# Patient Record
Sex: FEMALE | Race: ASIAN | NOT HISPANIC OR LATINO | Employment: UNEMPLOYED | URBAN - METROPOLITAN AREA
[De-identification: names, ages, dates, MRNs, and addresses within clinical notes are randomized per-mention and may not be internally consistent; named-entity substitution may affect disease eponyms.]

---

## 2017-04-17 ENCOUNTER — HOSPITAL ENCOUNTER (OUTPATIENT)
Dept: RADIOLOGY | Facility: MEDICAL CENTER | Age: 42
End: 2017-04-17
Attending: INTERNAL MEDICINE
Payer: COMMERCIAL

## 2017-04-17 DIAGNOSIS — Z11.1 SCREENING-PULMONARY TB: ICD-10-CM

## 2017-04-17 PROCEDURE — 71010 DX-CHEST-LIMITED (1 VIEW): CPT

## 2017-07-07 ENCOUNTER — HOSPITAL ENCOUNTER (OUTPATIENT)
Dept: RADIOLOGY | Facility: MEDICAL CENTER | Age: 42
End: 2017-07-07
Attending: FAMILY MEDICINE
Payer: COMMERCIAL

## 2017-07-07 ENCOUNTER — OFFICE VISIT (OUTPATIENT)
Dept: URGENT CARE | Facility: PHYSICIAN GROUP | Age: 42
End: 2017-07-07
Payer: COMMERCIAL

## 2017-07-07 VITALS
WEIGHT: 140 LBS | OXYGEN SATURATION: 98 % | SYSTOLIC BLOOD PRESSURE: 118 MMHG | TEMPERATURE: 98.3 F | BODY MASS INDEX: 24.02 KG/M2 | DIASTOLIC BLOOD PRESSURE: 70 MMHG | HEART RATE: 77 BPM

## 2017-07-07 DIAGNOSIS — K59.00 CONSTIPATION, UNSPECIFIED CONSTIPATION TYPE: ICD-10-CM

## 2017-07-07 DIAGNOSIS — R10.9 ABDOMINAL PAIN, UNSPECIFIED LOCATION: ICD-10-CM

## 2017-07-07 LAB
APPEARANCE UR: CLEAR
BILIRUB UR STRIP-MCNC: NORMAL MG/DL
COLOR UR AUTO: NORMAL
GLUCOSE UR STRIP.AUTO-MCNC: NORMAL MG/DL
INT CON NEG: NEGATIVE
INT CON POS: POSITIVE
KETONES UR STRIP.AUTO-MCNC: NORMAL MG/DL
LEUKOCYTE ESTERASE UR QL STRIP.AUTO: NORMAL
NITRITE UR QL STRIP.AUTO: NORMAL
PH UR STRIP.AUTO: 6 [PH] (ref 5–8)
POC URINE PREGNANCY TEST: NEGATIVE
PROT UR QL STRIP: NORMAL MG/DL
RBC UR QL AUTO: NORMAL
SP GR UR STRIP.AUTO: 1.01
UROBILINOGEN UR STRIP-MCNC: NORMAL MG/DL

## 2017-07-07 PROCEDURE — 99214 OFFICE O/P EST MOD 30 MIN: CPT | Performed by: FAMILY MEDICINE

## 2017-07-07 PROCEDURE — 74020 DX-ABDOMEN-2 VIEWS: CPT

## 2017-07-07 PROCEDURE — 81025 URINE PREGNANCY TEST: CPT | Performed by: FAMILY MEDICINE

## 2017-07-07 PROCEDURE — 81002 URINALYSIS NONAUTO W/O SCOPE: CPT | Performed by: FAMILY MEDICINE

## 2017-07-07 NOTE — PROGRESS NOTES
Chief Complaint:    Chief Complaint   Patient presents with   • Pain     has needle sharp pain on stomach x2days       History of Present Illness:    This is a new problem. 2 days ago, felt some pain in left mid abdomen region. It felt tender to touch skin, but it did not feel burning, tingling, or numbness in area. Recently now, she is starting to feel pain across abdomen, into right mid abdomen region too. Pain fluctuates, but is unpredictable. No fever, chills, nausea, vomiting, diarrhea, constipation, urine symptoms, or rash in area. Has all abdominal organs. No injury or trauma. No meds taken for the pain.      Review of Systems:    Constitutional: Negative for fever, chills, and diaphoresis.   Eyes: Negative for change in vision, photophobia, pain, redness, and discharge.  ENT: Negative for ear pain, ear discharge, hearing loss, tinnitus, nasal congestion, nosebleeds, and sore throat.    Respiratory: Negative for cough, hemoptysis, sputum production, shortness of breath, wheezing, and stridor.    Cardiovascular: Negative for chest pain, palpitations, orthopnea, claudication, leg swelling, and PND.   Gastrointestinal: See HPI.   Genitourinary: Negative for dysuria, urinary urgency, urinary frequency, hematuria, and flank pain.   Musculoskeletal: Negative for myalgias, joint pain, neck pain, and back pain.   Skin: Negative for rash and itching.   Neurological: Negative for dizziness, tingling, tremors, sensory change, speech change, focal weakness, seizures, loss of consciousness, and headaches.   Endo: Negative for polydipsia.   Heme: Does not bruise/bleed easily.   Psychiatric/Behavioral: Negative for depression, suicidal ideas, hallucinations, memory loss and substance abuse. The patient is not nervous/anxious and does not have insomnia.      Past Medical History:    History reviewed. No pertinent past medical history.    Past Surgical History:    History reviewed. No pertinent past surgical  history.    Social History:    Social History     Social History   • Marital Status:      Spouse Name: N/A   • Number of Children: N/A   • Years of Education: N/A     Occupational History   • Not on file.     Social History Main Topics   • Smoking status: Never Smoker    • Smokeless tobacco: Never Used   • Alcohol Use: No   • Drug Use: No   • Sexual Activity: No     Other Topics Concern   • Not on file     Social History Narrative       Family History:    History reviewed. No pertinent family history.    Medications:    Current Outpatient Prescriptions on File Prior to Visit   Medication Sig Dispense Refill   • triamcinolone acetonide (KENALOG) 0.1 % Cream APPLY THIN FILM TO RASH UP TO TWICE A DAY ONLY IF NEEDED. 80 g 3     No current facility-administered medications on file prior to visit.       Allergies:    No Known Allergies      Vitals:    Filed Vitals:    07/07/17 1547   BP: 118/70   Pulse: 77   Temp: 36.8 °C (98.3 °F)   Weight: 63.504 kg (140 lb)   SpO2: 98%       Physical Exam:    Constitutional: Vital signs reviewed. Appears well-developed and well-nourished. No acute distress.   Eyes: Sclera white, conjunctivae clear.   ENT: External ears normal. Hearing normal.   Neck: Neck supple.   Pulmonary/Chest: Respirations non-labored.   Abdomen: Bowel sounds are normal active. Soft, non-distended, and non-tender to palpation. No hepatosplenomegaly.   Musculoskeletal: Normal gait. Normal range of motion. No muscular atrophy or weakness.  Neurological: Alert and oriented to person, place, and time. Muscle tone normal. Coordination normal. Light touch and sensation normal.   Skin: No rashes or lesions. Warm, dry, normal turgor.  Psychiatric: Normal mood and affect. Behavior is normal. Judgment and thought content normal.     Diagnostics:    POCT URINALYSIS (Order #[id number]) on 7/7/17       Component Results      Component Value Ref Range & Units Status     POC Color straw Negative Final     POC Appearance  clear Negative Final     POC Leukocyte Esterase neg Negative Final     POC Nitrites neg Negative Final     POC Urobiligen neg Negative (0.2) mg/dL Final     POC Protein neg Negative mg/dL Final     POC Urine PH 6.0 5.0 - 8.0 Final     POC Blood neg Negative Final     POC Specific Gravity 1.010 <1.005 - >1.030 Final     POC Ketones neg Negative mg/dL Final     POC Biliruben neg Negative mg/dL Final     POC Glucose neg Negative mg/dL Final         Last Resulted Time     Fri Jul 7, 2017  4:26 PM     POCT PREGNANCY (Order #[id number]) on 7/7/17       Component Results      Component Value Ref Range & Units Status     POC Urine Pregnancy Test negative Negative Final     Internal Control Positive Positive  Final     Internal Control Negative Negative  Final         Last Resulted Time     Fri Jul 7, 2017  4:26 PM       HT-[id number]-0 VIEWS (Order #[id number]) on 7/7/17       Narrative        7/7/2017 4:29 PM    HISTORY/REASON FOR EXAM:  Left-sided abdominal pain for 2 days.      TECHNIQUE/EXAM DESCRIPTION AND NUMBER OF VIEWS:  2 view(s) of the abdomen, supine and upright.    COMPARISON: None    FINDINGS:  There is a nonobstructive nonspecific bowel gas pattern.  There is no evidence of free intraperitoneal air. There is a normal amount of stool throughout the colon.    There are no abnormal urinary tract calcifications.    There are phleboliths in the pelvis.         Impression        1.  Unremarkable abdominal series.     Images and Rad report reviewed with her and copy of report to her.      Assessment / Plan:    1. Abdominal pain, unspecified location  - POCT Urinalysis  - POCT PREGNANCY  - RN-TUFCUGY-2 VIEWS; Future  - polyethylene glycol-electrolytes (GOLYTELY) 236 GM Recon Soln; DRINK 240 ML (8 OUNCES) EVERY 10-30 MINUTES UNTIL HAVE A GOOD BOWEL MOVEMENT AND IMPROVEMENT OF ABDOMINAL PAIN.  Dispense: 4 L; Refill: 0    2. Constipation, unspecified constipation type  - polyethylene glycol-electrolytes (GOLYTELY) 236 GM  Recon Soln; DRINK 240 ML (8 OUNCES) EVERY 10-30 MINUTES UNTIL HAVE A GOOD BOWEL MOVEMENT AND IMPROVEMENT OF ABDOMINAL PAIN.  Dispense: 4 L; Refill: 0      Discussed with her DDX and management options.    Despite Radiology report, she looks to have enough gas and stool throughout abdomen to possibly cause her discomfort. I showed her example of abdominal x-ray that has much less gas and stool.    She does not have an acute abdomen on exam and she currently looks comfortable. I do not think she needs a CT of Abdomen now.    Agreeable to medication prescribed.    Advised if pain is persistently intense, she will need CT scan for evaluation and to return here or go to Emergency Room if after hours if this is the case.

## 2017-07-07 NOTE — MR AVS SNAPSHOT
Constantin Dev   2017 3:30 PM   Office Visit   MRN: [id number]    Department:  [facility]   Dept Phone:  [phone]    Description:  Female : 1975   Provider:  Efraín Hall M.D.           Reason for Visit     Pain has needle sharp pain on stomach x2days      Allergies as of 2017     No Known Allergies      You were diagnosed with     Abdominal pain, unspecified location   [[id number]]       Constipation, unspecified constipation type   [[id number]]         Vital Signs     Blood Pressure Pulse Temperature Weight Oxygen Saturation Smoking Status    118/70 mmHg 77 36.8 °C (98.3 °F) 63.504 kg (140 lb) 98% Never Smoker       Basic Information     Date Of Birth Sex Race Ethnicity Preferred Language    1975 Female  Non- English      Problem List              ICD-10-CM Priority Class Noted - Resolved    Intrinsic eczema L20.84   2016 - Present      Health Maintenance        Date Due Completion Dates    IMM DTaP/Tdap/Td Vaccine (1 - Tdap) 1993 ---    PAP SMEAR 1995 ---    MAMMOGRAM 2014 ---    IMM INFLUENZA (1) 2017 ---            Results     POCT Urinalysis      Component Value Standard Range & Units    POC Color straw Negative    POC Appearance clear Negative    POC Leukocyte Esterase neg Negative    POC Nitrites neg Negative    POC Urobiligen neg Negative (0.2) mg/dL    POC Protein neg Negative mg/dL    POC Urine PH 6.0 5.0 - 8.0    POC Blood neg Negative    POC Specific Gravity 1.010 <1.005 - >1.030    POC Ketones neg Negative mg/dL    POC Biliruben neg Negative mg/dL    POC Glucose neg Negative mg/dL                POCT PREGNANCY      Component Value Standard Range & Units    POC Urine Pregnancy Test negative Negative    Internal Control Positive Positive     Internal Control Negative Negative                         Current Immunizations     No immunizations on file.      Below and/or attached are the medications your provider expects you to take. Review all  of your home medications and newly ordered medications with your provider and/or pharmacist. Follow medication instructions as directed by your provider and/or pharmacist. Please keep your medication list with you and share with your provider. Update the information when medications are discontinued, doses are changed, or new medications (including over-the-counter products) are added; and carry medication information at all times in the event of emergency situations     Allergies:  No Known Allergies          Medications  Valid as of: July 07, 2017 -  5:24 PM    Generic Name Brand Name Tablet Size Instructions for use    PEG 3350-KCl-NaBcb-NaCl-NaSulf (Recon Soln) GOLYTELY 236 GM DRINK 240 ML (8 OUNCES) EVERY 10-30 MINUTES UNTIL HAVE A GOOD BOWEL MOVEMENT AND IMPROVEMENT OF ABDOMINAL PAIN.        Triamcinolone Acetonide (Cream) KENALOG 0.1 % APPLY THIN FILM TO RASH UP TO TWICE A DAY ONLY IF NEEDED.        .                 Medicines prescribed today were sent to:     [facility] [address] [place], [place] [address] [address] [address]    Phone: [phone] Fax: [phone]    Open 24 Hours?: No      Medication refill instructions:       If your prescription bottle indicates you have medication refills left, it is not necessary to call your provider’s office. Please contact your pharmacy and they will refill your medication.    If your prescription bottle indicates you do not have any refills left, you may request refills at any time through one of the following ways: The online myThings system (except Urgent Care), by calling your provider’s office, or by asking your pharmacy to contact your provider’s office with a refill request. Medication refills are processed only during regular business hours and may not be available until the next business day. Your provider may request additional information or to have a follow-up visit with you prior to refilling your medication.      *Please Note: Medication refills are assigned a new Rx number when refilled electronically. Your pharmacy may indicate that no refills were authorized even though a new prescription for the same medication is available at the pharmacy. Please request the medicine by name with the pharmacy before contacting your provider for a refill.        Your To Do List     Future Labs/Procedures Complete By Expires    [id number] VIEWS  As directed 7/28/2017         [employer] Access Code: [email or web address]  Expires: 8/6/2017  5:24 PM    Your email address is not on file at [facility].  Email Addresses are required for you to sign up for [employer], please contact [phone] to verify your personal information and to provide your email address prior to attempting to register for [employer].    Constantin Méndez  [address] [place] [id number]    Qu[employer]  A secure, online tool to manage your health information     [facility]’s Qu[employer]® is a secure, online tool that connects you to your personalized health information from the privacy of your home -- day or night - making it very easy for you to manage your healthcare. Once the activation process is completed, you can even access your medical information using the Quinyx AB lety, which is available for free in the Apple Lety store or Google Play store.     To learn more about Quinyx AB, visit [email or web address]    There are two levels of access available (as shown below):   My Chart Features  [facility] Primary Care Doctor [facility]  Specialists [facility]  Urgent  Care Non-[facility] Primary Care Doctor   Email your healthcare team securely and privately 24/7 X X X    Manage appointments: schedule your next appointment; view details of past/upcoming appointments X      Request prescription refills. X      View recent personal medical records, including lab and immunizations X X X X   View health record, including health history, allergies, medications X X X X   Read reports about your outpatient  visits, procedures, consult and ER notes X X X X   See your discharge summary, which is a recap of your hospital and/or ER visit that includes your diagnosis, lab results, and care plan X X  X     How to register for [employer]:  Once your e-mail address has been verified, follow the following steps to sign up for [employer].     1. Go to  [email or web address]  2. Click on the Sign Up Now box, which takes you to the New Member Sign Up page. You will need to provide the following information:  a. Enter your Dec[employer]iv Access Code exactly as it appears at the top of this page. (You will not need to use this code after you’ve completed the sign-up process. If you do not sign up before the expiration date, you must request a new code.)   b. Enter your date of birth.   c. Enter your home email address.   d. Click Submit, and follow the next screen’s instructions.  3. Create a Decisiv ID. This will be your Decisiv login ID and cannot be changed, so think of one that is secure and easy to remember.  4. Create a [employer] password. You can change your password at any time.  5. Enter your Password Reset Question and Answer. This can be used at a later time if you forget your password.   6. Enter your e-mail address. This allows you to receive e-mail notifications when new information is available in Decisiv.  7. Click Sign Up. You can now view your health information.    For assistance activating your [employer] account, call [phone]

## 2019-04-22 ENCOUNTER — OFFICE VISIT (OUTPATIENT)
Dept: MEDICAL GROUP | Facility: PHYSICIAN GROUP | Age: 44
End: 2019-04-22
Payer: COMMERCIAL

## 2019-04-22 VITALS
TEMPERATURE: 97.5 F | HEART RATE: 65 BPM | HEIGHT: 63 IN | DIASTOLIC BLOOD PRESSURE: 64 MMHG | OXYGEN SATURATION: 99 % | RESPIRATION RATE: 12 BRPM | BODY MASS INDEX: 27.11 KG/M2 | SYSTOLIC BLOOD PRESSURE: 94 MMHG | WEIGHT: 153 LBS

## 2019-04-22 DIAGNOSIS — Z12.31 ENCOUNTER FOR SCREENING MAMMOGRAM FOR BREAST CANCER: ICD-10-CM

## 2019-04-22 DIAGNOSIS — L20.84 INTRINSIC ECZEMA: ICD-10-CM

## 2019-04-22 DIAGNOSIS — Z00.00 ROUTINE HEALTH MAINTENANCE: ICD-10-CM

## 2019-04-22 PROCEDURE — 99214 OFFICE O/P EST MOD 30 MIN: CPT | Performed by: NURSE PRACTITIONER

## 2019-04-22 ASSESSMENT — PATIENT HEALTH QUESTIONNAIRE - PHQ9: CLINICAL INTERPRETATION OF PHQ2 SCORE: 0

## 2019-04-22 NOTE — PROGRESS NOTES
CC:   Chief Complaint   Patient presents with   • Establish Care     new patient,        HISTORY OF THE PRESENT ILLNESS: Patient is a 44 y.o. female. This pleasant patient is here today to establish care and discuss issue as listed below.    Health Maintenance: Completed, patient scheduled for Pap smear on 4/29/2019 and mammogram screening on 4/23/2019.    Intrinsic eczema  This is a chronic problem for the patient that is ongoing and stable.  Patient reports that she will have flares, usually on her bilateral antecubital region and bilateral ankles.  The patient does use lotion daily, reports that fragrance lotion usually burns and causes itching.  The patient did have an episode when she was pregnant that her entire body was itching, but this has not happened since pregnancy.  Patient states that this problem is generally well controlled unless she gets too hot, too hot outside and she sweats or if her shower water is too hot, then she will experience itching and a rash.    Allergies: Patient has no known allergies.    No current [facility]-ordered outpatient prescriptions on file.     No current [facility]-ordered facility-administered medications on file.        Past Medical History:   Diagnosis Date   • Eczema        Past Surgical History:   Procedure Laterality Date   • BIOPSY GENERAL Right 2005    mole excision right ankle   • CYST EXCISION Left 1997    inferior to ear       Social History   Substance Use Topics   • Smoking status: Never Smoker   • Smokeless tobacco: Never Used   • Alcohol use No       Social History     Social History Narrative   • No narrative on file       Family History   Problem Relation Age of Onset   • Diabetes Mother    • Heart Disease Father    • Alcohol/Drug Father    • No Known Problems Brother    • No Known Problems Maternal Grandmother    • Cancer Maternal Grandfather    • No Known Problems Paternal Grandmother    • No Known Problems Paternal Grandfather    • No Known Problems Daughter    •  "No Known Problems Son      ROS:   Constitutional:  Negative for fever, chills, unexpected weight change, night sweats, body aches, sleep issues, and fatigue/generalized weakness.   HEENT:  Negative for headaches, vision changes, hearing changes, ear pain, tinnitus, ear discharge, rhinorrhea, sinus congestion, sneezing, sore throat, and neck pain.    Respiratory:  Negative for cough, shortness of breath, sputum production, hemoptysis, chest congestion, dyspnea, wheezing, and crackles.    Cardiovascular:  Negative for chest pain, palpitations, MOYA, paroxsymal nocturnal dyspnea, orthopnea, and bilateral lower extremity edema.   Gastrointestinal:  Negative for heartburn, nausea, vomiting, abdominal pain, hematochezia, melena, diarrhea, constipation, and greasy/foul-smelling stools.   Genitourinary:  Negative for dysuria, nocturia, polyuria, hematuria, pyuria, urinary urgency, urinary frequency, and urinary incontinence.   Musculoskeletal:  Negative for myalgias, back pain, and joint pain.   Skin:  Negative for rash, sores, lumps, itching, cyanotic skin color change.   Neurological:  Negative for dizziness, tingling, tremors, focal sensory deficit, focal weakness and headaches.   Endo/Heme/Allergies:  Does not bruise/bleed easily. Denies cold/heat intolerance.   Psychiatric/Behavioral: Negative for depression, suicidal/homicidal ideation and memory loss.        Exam: BP (!) 94/64   Pulse 65   Temp 36.4 °C (97.5 °F)   Resp 12   Ht 1.6 m (5' 3\")   Wt 69.4 kg (153 lb)   SpO2 99%  Body mass index is 27.1 kg/m².    General:  Normal appearing. No distress.  HEENT:  Normocephalic. Eyes conjunctiva clear lids without ptosis, pupils equal and reactive to light accommodation, ears normal shape and contour, canals are clear bilaterally, tympanic membranes are benign, nasal mucosa benign, oropharynx is without erythema, edema or exudates.   Neck:  Supple without JVD or bruit. Thyroid is not enlarged.  Pulmonary:  Clear to " ausculation.  Normal effort. No rales, ronchi, or wheezing.  Cardiovascular:  Regular rate and rhythm without murmur. Carotid and radial pulses are intact and equal bilaterally.  Abdomen:  Soft, nontender, nondistended. Normal bowel sounds.  Neurologic:  Grossly nonfocal.  Skin:  Warm and dry.  No obvious lesions.  Musculoskeletal:  Normal gait. No extremity cyanosis, clubbing, or edema.  Psych:  Normal mood and affect. Alert and oriented x3. Judgment and insight is normal.    Assessment/Plan:  1. Intrinsic eczema  Continue over-the-counter lotions as tolerated and avoid situations that aggravate the problem   2. Encounter for screening mammogram for breast cancer  MA-SCREENING MAMMO BILAT W/TOMOSYNTHESIS W/CAD   3. Routine health maintenance  Comp Metabolic Panel    Lipid Profile     Educated in proper administration of medication(s) ordered today including safety, possible SE, risks, benefits, rationale and alternatives to therapy.   Supportive care, differential diagnoses, and indications for immediate follow-up discussed with patient.    Pathogenesis of diagnosis discussed including typical length and natural progression.    Instructed to return to clinic or nearest emergency department for any change in condition, further concerns, or worsening of symptoms.  Patient states understanding of the plan of care and discharge instructions.    Return for Pap, Follow up Labs.    Please note that this dictation was created using voice recognition software. I have made every reasonable attempt to correct obvious errors, but I expect that there are errors of grammar and possibly content that I did not discover before finalizing the note.

## 2019-04-22 NOTE — ASSESSMENT & PLAN NOTE
This is a chronic problem for the patient that is ongoing and stable.  Patient reports that she will have flares, usually on her bilateral antecubital region and bilateral ankles.  The patient does use lotion daily, reports that fragrance lotion usually burns and causes itching.  The patient did have an episode when she was pregnant that her entire body was itching, but this has not happened since pregnancy.  Patient states that this problem is generally well controlled unless she gets too hot, too hot outside and she sweats or if her shower water is too hot, then she will experience itching and a rash.

## 2019-04-23 ENCOUNTER — HOSPITAL ENCOUNTER (OUTPATIENT)
Dept: RADIOLOGY | Facility: MEDICAL CENTER | Age: 44
End: 2019-04-23
Attending: NURSE PRACTITIONER
Payer: COMMERCIAL

## 2019-04-23 DIAGNOSIS — Z12.31 ENCOUNTER FOR SCREENING MAMMOGRAM FOR BREAST CANCER: ICD-10-CM

## 2019-04-23 PROCEDURE — 77063 BREAST TOMOSYNTHESIS BI: CPT

## 2019-04-24 ENCOUNTER — HOSPITAL ENCOUNTER (OUTPATIENT)
Dept: RADIOLOGY | Facility: MEDICAL CENTER | Age: 44
End: 2019-04-24

## 2019-04-29 ENCOUNTER — APPOINTMENT (OUTPATIENT)
Dept: MEDICAL GROUP | Facility: PHYSICIAN GROUP | Age: 44
End: 2019-04-29
Payer: COMMERCIAL

## 2019-05-06 ENCOUNTER — OFFICE VISIT (OUTPATIENT)
Dept: MEDICAL GROUP | Facility: PHYSICIAN GROUP | Age: 44
End: 2019-05-06
Payer: COMMERCIAL

## 2019-05-06 ENCOUNTER — HOSPITAL ENCOUNTER (OUTPATIENT)
Facility: MEDICAL CENTER | Age: 44
End: 2019-05-06
Attending: NURSE PRACTITIONER
Payer: COMMERCIAL

## 2019-05-06 VITALS
OXYGEN SATURATION: 96 % | BODY MASS INDEX: 27.11 KG/M2 | TEMPERATURE: 97.3 F | DIASTOLIC BLOOD PRESSURE: 60 MMHG | WEIGHT: 153 LBS | SYSTOLIC BLOOD PRESSURE: 92 MMHG | HEART RATE: 64 BPM | HEIGHT: 63 IN | RESPIRATION RATE: 12 BRPM

## 2019-05-06 DIAGNOSIS — Z11.51 SCREENING FOR HPV (HUMAN PAPILLOMAVIRUS): ICD-10-CM

## 2019-05-06 DIAGNOSIS — Z12.4 SCREENING FOR MALIGNANT NEOPLASM OF CERVIX: ICD-10-CM

## 2019-05-06 DIAGNOSIS — Z01.419 WELL WOMAN EXAM WITH ROUTINE GYNECOLOGICAL EXAM: ICD-10-CM

## 2019-05-06 PROCEDURE — 99396 PREV VISIT EST AGE 40-64: CPT | Performed by: NURSE PRACTITIONER

## 2019-05-06 NOTE — PROGRESS NOTES
Subjective:     CC:   Chief Complaint   Patient presents with   • Gynecologic Exam     pap     HPI:   Constantin Méndez is a 44 y.o. female presents for a routine preventive health exam. She is feeling well and has no questions or concerns.    Ob-Gyn/ History:    /Para:  4/2  Patient has GYN provider: PCP  Last Pap Smear:  Never  Gyn Surgery:  None.  Current Contraceptive Method:  None. Currently sexually active with her .  Last menstrual period: 3/29/2019.  Periods regular.  Hx. STD's: None  Urinary incontinence: Some with coughing and sneezing since giving birth.    Menses every month with 28 days light bleeding. Cramping is none.   She does not take OTC analgesics for cramps.  No significant bloating/fluid retention, pelvic pain, or dyspareunia. No vaginal discharge.   No breast tenderness, mass, nipple discharge, changes in size or contour, or abnormal cyclic discomfort.  She does not perform regular self breast exams.    Health Maintenance  Advanced directive: NA   Osteoporosis Screen/ DEXA: NA   PT/vit D for falls prevention: NA   Cholesterol Screening: Ordered   Diabetes Screening: Ordered  AAA Screening: NA  Aspirin Use: NA    Diet: Pretty healthy   Exercise: No exercise   Substance Abuse: No   Safe in relationship.  Seat belts, bike helmet, gun safety discussed.  Sun protection used.    Cancer screening  Colorectal Cancer Screening: NA    Lung Cancer Screening: NA    Cervical Cancer Screenin2019    Breast Cancer Screening: Completed 2019   Prostate Cancer Screening/PSA: NA     Infectious disease screening/Immunizations  STI Screening: NA   Practices safe sex.  Immunizations:    Influenza: Not in season    HPV:  Aged out    Tetanus: 2012    Shingles: NA    Pneumococcal : NA     Other immunizations: NA    She  has a past medical history of Eczema.  She  has a past surgical history that includes cyst excision (Left, ) and biopsy general (Right, ).    Family History   Problem  "Relation Age of Onset   • Diabetes Mother    • Heart Disease Father    • Alcohol/Drug Father    • No Known Problems Brother    • No Known Problems Maternal Grandmother    • Cancer Maternal Grandfather    • No Known Problems Paternal Grandmother    • No Known Problems Paternal Grandfather    • No Known Problems Daughter    • No Known Problems Son        Social History     Social History   • Marital status:      Spouse name: N/A   • Number of children: N/A   • Years of education: N/A     Occupational History   • Not on file.     Social History Main Topics   • Smoking status: Never Smoker   • Smokeless tobacco: Never Used   • Alcohol use No   • Drug use: No   • Sexual activity: No     Other Topics Concern   • Not on file     Social History Narrative   • No narrative on file       Patient Active Problem List    Diagnosis Date Noted   • Intrinsic eczema 09/06/2016       No current outpatient prescriptions on file.     No current facility-administered medications for this visit.      No Known Allergies    Review of Systems  Constitutional:  Negative for fever, chills and malaise/fatigue.   HENT:  Negative for congestion.    Eyes:  Negative for pain.   Respiratory:  Negative for cough and shortness of breath.    Cardiovascular:  Negative for leg swelling.   Gastrointestinal:  Negative for nausea, vomiting, abdominal pain and diarrhea.   Genitourinary:  Negative for dysuria and hematuria.   Skin:  Negative for rash.   Neurological:  Negative for dizziness, focal weakness and headaches.   Endo/Heme/Allergies:  Does not bruise/bleed easily.   Psychiatric/Behavioral:  Negative for depression.  The patient is not nervous/anxious.      Objective:     BP (!) 92/60   Pulse 64   Temp 36.3 °C (97.3 °F)   Resp 12   Ht 1.6 m (5' 3\")   Wt 69.4 kg (153 lb)   SpO2 96%   BMI 27.10 kg/m²   Body mass index is 27.1 kg/m².  Wt Readings from Last 4 Encounters:   05/06/19 69.4 kg (153 lb)   04/22/19 69.4 kg (153 lb)   07/07/17 " 63.5 kg (140 lb)   09/06/16 63.5 kg (140 lb)       Physical Exam:  Constitutional:  Well-developed and well-nourished. Not diaphoretic. No distress.   Skin:  Skin is warm and dry. No rash noted.  Head:  Atraumatic without lesions.  Eyes:  Conjunctivae and extraocular motions are normal. Pupils are equal, round, and reactive to light. No scleral icterus.   Ears:   External ears unremarkable. Tympanic membranes clear and intact.  Nose:  Nares patent. Septum midline. Turbinates without erythema nor edema. No discharge.   Mouth/Throat:  Dentition is good. Tongue normal. Oropharynx is clear and moist. Posterior pharynx without erythema or exudates.  Neck:  Supple, trachea midline. Normal range of motion. No thyromegaly present. No lymphadenopathy--cervical or supraclavicular.  Cardiovascular:  Regular rate and rhythm, S1 and S2 without murmur, rubs, or gallops.    Breast: Breast exam deferred. Discussed monthly self exams and what to look for, including peau d'orange or nipple retraction, discharge, breasts moving freely and equally without restriction, axillary/supraclavicular adenopathy, or palpable masses/nodules.  Abdomen:  Soft, non tender, and without distention. Active bowel sounds in all four quadrants. No rebound, guarding, masses or HSM.  Extremities:  No cyanosis, clubbing, erythema, nor edema. Distal pulses intact and symmetric.   :  Perineum and external genitalia normal without rash. Vagina with normal and physiologic discharge. Cervix without visible lesions or discharge. Bimanual exam without adnexal masses or cervical motion tenderness.  Musculoskeletal:  All major joints AROM full in all directions without pain.  Neurological:  Alert and oriented x 3. No cranial nerve deficit. Sensation intact.  Psychiatric:  Behavior, mood, and affect are appropriate.    A chaperone was offered to the patient during today's exam. Chaperone name: Fabien was present.    Assessment and Plan:     1. Well woman exam with  routine gynecological exam     2. Screening for malignant neoplasm of cervix  THINPREP PAP WITH HPV   3. Screening for HPV (human papillomavirus)  THINPREP PAP WITH HPV     HCM:  Updated   Labs per orders Previously ordered  Immunizations per orders Up to date    Patient Counseling:  --Discussed moderation in sodium/caffeine intake, saturated fat and cholesterol, caloric balance, sufficient fresh fruits/vegetables, fiber, iron, and 0.4-0.8mg of folate supplement per day (for females capable of pregnancy).  --Discussed brushing, flossing, and dental visits.   --Encouraged regular exercise.   --Discussed tobacco, alcohol, or other drug use; availability of treatment for abuse.   --Discussed sexually transmitted infections, partner selection, use of condoms, avoidance of unintended pregnancy and contraceptive alternatives.  --Discussed the issue of estrogen replacement, calcium supplement, and the daily use of baby aspirin.  --Injury prevention: Discussed safety belts, safety helmets, smoke detector, etc.    Follow-up: Return in about 1 year (around 5/6/2020) for Preventative Annual.

## 2019-05-08 LAB
FORWARD REASON: SPWHY: NORMAL
FORWARDED TO LAB: SPWHR: NORMAL
SPECIMEN SENT: SPWT1: NORMAL

## 2019-05-20 ENCOUNTER — HOSPITAL ENCOUNTER (OUTPATIENT)
Dept: LAB | Facility: MEDICAL CENTER | Age: 44
End: 2019-05-20
Attending: NURSE PRACTITIONER
Payer: COMMERCIAL

## 2019-05-20 ENCOUNTER — OFFICE VISIT (OUTPATIENT)
Dept: MEDICAL GROUP | Facility: PHYSICIAN GROUP | Age: 44
End: 2019-05-20
Payer: COMMERCIAL

## 2019-05-20 VITALS
HEART RATE: 68 BPM | OXYGEN SATURATION: 99 % | DIASTOLIC BLOOD PRESSURE: 64 MMHG | WEIGHT: 150 LBS | BODY MASS INDEX: 26.58 KG/M2 | SYSTOLIC BLOOD PRESSURE: 96 MMHG | HEIGHT: 63 IN | TEMPERATURE: 97.6 F | RESPIRATION RATE: 16 BRPM

## 2019-05-20 DIAGNOSIS — Z00.00 ROUTINE HEALTH MAINTENANCE: ICD-10-CM

## 2019-05-20 DIAGNOSIS — L20.84 INTRINSIC ECZEMA: ICD-10-CM

## 2019-05-20 LAB
ALBUMIN SERPL BCP-MCNC: 4 G/DL (ref 3.2–4.9)
ALBUMIN/GLOB SERPL: 1.4 G/DL
ALP SERPL-CCNC: 45 U/L (ref 30–99)
ALT SERPL-CCNC: 8 U/L (ref 2–50)
ANION GAP SERPL CALC-SCNC: 7 MMOL/L (ref 0–11.9)
AST SERPL-CCNC: 12 U/L (ref 12–45)
BILIRUB SERPL-MCNC: 0.9 MG/DL (ref 0.1–1.5)
BUN SERPL-MCNC: 15 MG/DL (ref 8–22)
CALCIUM SERPL-MCNC: 8.9 MG/DL (ref 8.5–10.5)
CHLORIDE SERPL-SCNC: 108 MMOL/L (ref 96–112)
CHOLEST SERPL-MCNC: 135 MG/DL (ref 100–199)
CO2 SERPL-SCNC: 25 MMOL/L (ref 20–33)
CREAT SERPL-MCNC: 0.66 MG/DL (ref 0.5–1.4)
FASTING STATUS PATIENT QL REPORTED: NORMAL
GLOBULIN SER CALC-MCNC: 2.8 G/DL (ref 1.9–3.5)
GLUCOSE SERPL-MCNC: 90 MG/DL (ref 65–99)
HDLC SERPL-MCNC: 51 MG/DL
LDLC SERPL CALC-MCNC: 76 MG/DL
POTASSIUM SERPL-SCNC: 4.1 MMOL/L (ref 3.6–5.5)
PROT SERPL-MCNC: 6.8 G/DL (ref 6–8.2)
SODIUM SERPL-SCNC: 140 MMOL/L (ref 135–145)
TRIGL SERPL-MCNC: 42 MG/DL (ref 0–149)

## 2019-05-20 PROCEDURE — 80053 COMPREHEN METABOLIC PANEL: CPT

## 2019-05-20 PROCEDURE — 36415 COLL VENOUS BLD VENIPUNCTURE: CPT

## 2019-05-20 PROCEDURE — 99212 OFFICE O/P EST SF 10 MIN: CPT | Performed by: NURSE PRACTITIONER

## 2019-05-20 PROCEDURE — 80061 LIPID PANEL: CPT

## 2019-05-20 NOTE — PROGRESS NOTES
CC:   Chief Complaint   Patient presents with   • Follow-Up     intrinsic eczema        HISTORY OF THE PRESENT ILLNESS: Patient is a 44 y.o. female. This pleasant patient is here today for one month follow up and lab review (labs not yet done).    Health Maintenance: Completed.    Intrinsic eczema  This is a chronic problem for the patient that continues to be stable.  Patient reports that her flares usually affect her bilateral AC regions and bilateral ankles. Patient avoids triggers which include fragrance lotion, sweating, and too hot of environments including hot weather or hot showers.    Allergies: Patient has no known allergies.    No current [facility]-ordered outpatient prescriptions on file.     No current [facility]-ordered facility-administered medications on file.        Past Medical History:   Diagnosis Date   • Eczema        Past Surgical History:   Procedure Laterality Date   • BIOPSY GENERAL Right 2005    mole excision right ankle   • CYST EXCISION Left 1997    inferior to ear       Social History   Substance Use Topics   • Smoking status: Never Smoker   • Smokeless tobacco: Never Used   • Alcohol use No       Social History     Social History Narrative   • No narrative on file       Family History   Problem Relation Age of Onset   • Diabetes Mother    • Heart Disease Father    • Alcohol/Drug Father    • No Known Problems Brother    • No Known Problems Maternal Grandmother    • Cancer Maternal Grandfather    • No Known Problems Paternal Grandmother    • No Known Problems Paternal Grandfather    • No Known Problems Daughter    • No Known Problems Son      ROS:   Constitutional:  Negative for fever, chills, unexpected weight change, night sweats, body aches, sleep issues, and fatigue/generalized weakness.   Respiratory:  Negative for cough, shortness of breath, sputum production, hemoptysis, chest congestion, dyspnea, wheezing, and crackles.    Cardiovascular:  Negative for chest pain, palpitations, MOYA,  "paroxsymal nocturnal dyspnea, orthopnea, and bilateral lower extremity edema.   Musculoskeletal:  Negative for myalgias, back pain, and joint pain.   Skin:  Negative for rash, sores, lumps, itching, cyanotic skin color change.   Psychiatric/Behavioral: Negative for depression, suicidal/homicidal ideation and memory loss.        Exam: BP (!) 96/64 (BP Location: Left arm, Patient Position: Sitting)   Pulse 68   Temp 36.4 °C (97.6 °F)   Resp 16   Ht 1.6 m (5' 3\")   Wt 68 kg (150 lb)   SpO2 99%  Body mass index is 26.57 kg/m².    General:  Normal appearing. No distress.  HEENT:  Normocephalic. Eyes conjunctiva clear lids without ptosis, pupils equal and reactive to light accommodation, ears normal shape and contour.   Neck:  Supple without JVD or bruit.  Pulmonary:  Clear to ausculation.  Normal effort. No rales, ronchi, or wheezing.  Cardiovascular:  Regular rate and rhythm without murmur.   Neurologic:  Grossly nonfocal.  Skin:  Warm and dry.  No obvious lesions.  Musculoskeletal:  Normal gait. No extremity cyanosis, clubbing, or edema.  Psych:  Normal mood and affect. Alert and oriented x3. Judgment and insight is normal.    Assessment/Plan:  1. Intrinsic eczema  Continue to avoid triggers  Patient will complete previously ordered lab work today. Follow up based on results.     Educated in proper administration of medication(s) ordered today including safety, possible SE, risks, benefits, rationale and alternatives to therapy.   Supportive care, differential diagnoses, and indications for immediate follow-up discussed with patient.    Pathogenesis of diagnosis discussed including typical length and natural progression.    Instructed to return to clinic or nearest emergency department for any change in condition, further concerns, or worsening of symptoms.  Patient states understanding of the plan of care and discharge instructions.    Return in about 1 year (around 5/20/2020) for Preventative Annual. Or sooner " depending on lab results.    Please note that this dictation was created using voice recognition software. I have made every reasonable attempt to correct obvious errors, but I expect that there are errors of grammar and possibly content that I did not discover before finalizing the note.

## 2019-05-20 NOTE — ASSESSMENT & PLAN NOTE
This is a chronic problem for the patient that continues to be stable.  Patient reports that her flares usually affect her bilateral AC regions and bilateral ankles. Patient avoids triggers which include fragrance lotion, sweating, and too hot of environments including hot weather or hot showers.

## 2020-03-16 ENCOUNTER — OFFICE VISIT (OUTPATIENT)
Dept: URGENT CARE | Facility: PHYSICIAN GROUP | Age: 45
End: 2020-03-16
Payer: COMMERCIAL

## 2020-03-16 VITALS
TEMPERATURE: 97.5 F | OXYGEN SATURATION: 100 % | WEIGHT: 153 LBS | RESPIRATION RATE: 14 BRPM | SYSTOLIC BLOOD PRESSURE: 112 MMHG | BODY MASS INDEX: 27.11 KG/M2 | HEIGHT: 63 IN | HEART RATE: 72 BPM | DIASTOLIC BLOOD PRESSURE: 80 MMHG

## 2020-03-16 DIAGNOSIS — J02.0 STREP PHARYNGITIS: ICD-10-CM

## 2020-03-16 DIAGNOSIS — R52 BODY ACHES: ICD-10-CM

## 2020-03-16 LAB
INT CON NEG: NORMAL
INT CON POS: NORMAL
S PYO AG THROAT QL: POSITIVE

## 2020-03-16 PROCEDURE — 87880 STREP A ASSAY W/OPTIC: CPT | Performed by: PHYSICIAN ASSISTANT

## 2020-03-16 PROCEDURE — 99214 OFFICE O/P EST MOD 30 MIN: CPT | Performed by: PHYSICIAN ASSISTANT

## 2020-03-16 RX ORDER — AMOXICILLIN 875 MG/1
875 TABLET, COATED ORAL 2 TIMES DAILY
Qty: 20 TAB | Refills: 0 | Status: SHIPPED | OUTPATIENT
Start: 2020-03-16 | End: 2020-03-26

## 2020-03-16 ASSESSMENT — ENCOUNTER SYMPTOMS
TROUBLE SWALLOWING: 1
HEADACHES: 1
DIARRHEA: 0
COUGH: 0
EYE DISCHARGE: 0
EYE REDNESS: 0
SORE THROAT: 1
SWOLLEN GLANDS: 1
WHEEZING: 0
VOMITING: 0
CHILLS: 1
MYALGIAS: 1

## 2020-03-16 NOTE — PROGRESS NOTES
"Subjective:      Constantin Méndez is a 45 y.o. female who presents with Pharyngitis (x2days dizzy , bodyaches)            Pharyngitis    This is a new problem. The current episode started yesterday. The problem has been gradually worsening. Neither side of throat is experiencing more pain than the other. Maximum temperature: pos for subjective fevers.  The pain is moderate. Associated symptoms include headaches, swollen glands and trouble swallowing. Pertinent negatives include no congestion, coughing, diarrhea or vomiting. Associated symptoms comments: Pos for body aches.   . She has had no exposure to strep or mono. She has tried cool liquids for the symptoms. The treatment provided mild relief.       Review of Systems   Constitutional: Positive for chills and malaise/fatigue.   HENT: Positive for sore throat and trouble swallowing. Negative for congestion.    Eyes: Negative for discharge and redness.   Respiratory: Negative for cough and wheezing.    Gastrointestinal: Negative for diarrhea and vomiting.   Musculoskeletal: Positive for myalgias.   Neurological: Positive for headaches.   All other systems reviewed and are negative.         Objective:     /80   Pulse 72   Temp 36.4 °C (97.5 °F) (Temporal)   Resp 14   Ht 1.6 m (5' 3\")   Wt 69.4 kg (153 lb)   SpO2 100%   BMI 27.10 kg/m²    PMH:  has a past medical history of Eczema.  MEDS: Reviewed .   ALLERGIES: No Known Allergies  SURGHX:   Past Surgical History:   Procedure Laterality Date   • BIOPSY GENERAL Right 2005    mole excision right ankle   • CYST EXCISION Left 1997    inferior to ear     SOCHX:  reports that she has never smoked. She has never used smokeless tobacco. She reports that she does not drink alcohol or use drugs.  FH: Family history was reviewed, no pertinent findings to report    Physical Exam  Vitals signs reviewed.   Constitutional:       Appearance: She is well-developed.   HENT:      Head: Normocephalic and atraumatic.      Right Ear: " External ear normal.      Left Ear: External ear normal.      Nose: Nose normal.      Mouth/Throat:      Pharynx: Uvula midline. Oropharyngeal exudate present.      Tonsils: No tonsillar abscesses.   Eyes:      Pupils: Pupils are equal, round, and reactive to light.   Neck:      Musculoskeletal: Normal range of motion and neck supple.      Thyroid: No thyromegaly.   Cardiovascular:      Rate and Rhythm: Normal rate and regular rhythm.   Pulmonary:      Effort: Pulmonary effort is normal. No respiratory distress.      Breath sounds: Normal breath sounds.   Musculoskeletal: Normal range of motion.   Lymphadenopathy:      Cervical: Cervical adenopathy present.   Skin:     General: Skin is warm.      Coloration: Skin is not pale.      Findings: No rash.   Neurological:      Mental Status: She is alert and oriented to person, place, and time.   Psychiatric:         Behavior: Behavior normal.               Positive strep.     Assessment/Plan:       1. Strep pharyngitis  - amoxicillin (AMOXIL) 875 MG tablet; Take 1 Tab by mouth 2 times a day for 10 days.  Dispense: 20 Tab; Refill: 0    2. Body aches    Change toothbrush.   Diet changes discussed.   Discussed contagious nature of symptoms today as well.     Patient and/or guardian given precautionary s/sx that mandate immediate follow up and evaluation in the ED. Advised of risks of not doing so.  Side effects of the above medications discussed.   DDX, Supportive care, and indications for immediate follow-up discussed with patient.    Instructed to return to clinic or nearest emergency department if we are not available for any change in condition, further concerns, or worsening of symptoms.    The patient and/or guardian demonstrated a good understanding and agreed with the treatment plan.    Please note that this dictation was created using voice recognition software. I have made every reasonable attempt to correct obvious errors, but I expect that there are errors of  grammar and possibly content that I did not discover before finalizing the note.

## 2020-07-14 ENCOUNTER — HOSPITAL ENCOUNTER (OUTPATIENT)
Dept: RADIOLOGY | Facility: MEDICAL CENTER | Age: 45
End: 2020-07-14
Attending: NURSE PRACTITIONER
Payer: COMMERCIAL

## 2020-07-14 ENCOUNTER — OFFICE VISIT (OUTPATIENT)
Dept: MEDICAL GROUP | Facility: PHYSICIAN GROUP | Age: 45
End: 2020-07-14
Payer: COMMERCIAL

## 2020-07-14 VITALS
WEIGHT: 158 LBS | TEMPERATURE: 98.5 F | SYSTOLIC BLOOD PRESSURE: 124 MMHG | HEART RATE: 67 BPM | DIASTOLIC BLOOD PRESSURE: 82 MMHG | HEIGHT: 63 IN | OXYGEN SATURATION: 96 % | BODY MASS INDEX: 28 KG/M2 | RESPIRATION RATE: 14 BRPM

## 2020-07-14 DIAGNOSIS — M25.511 CHRONIC RIGHT SHOULDER PAIN: ICD-10-CM

## 2020-07-14 DIAGNOSIS — Z00.00 ROUTINE HEALTH MAINTENANCE: ICD-10-CM

## 2020-07-14 DIAGNOSIS — Z12.31 ENCOUNTER FOR SCREENING MAMMOGRAM FOR BREAST CANCER: ICD-10-CM

## 2020-07-14 DIAGNOSIS — G89.29 CHRONIC RIGHT SHOULDER PAIN: ICD-10-CM

## 2020-07-14 PROCEDURE — 99214 OFFICE O/P EST MOD 30 MIN: CPT | Performed by: NURSE PRACTITIONER

## 2020-07-14 PROCEDURE — 73030 X-RAY EXAM OF SHOULDER: CPT | Mod: RT

## 2020-07-14 ASSESSMENT — PATIENT HEALTH QUESTIONNAIRE - PHQ9: CLINICAL INTERPRETATION OF PHQ2 SCORE: 0

## 2020-07-14 NOTE — PROGRESS NOTES
CC:   Chief Complaint   Patient presents with   • Shoulder Pain     right shoulder x 6 months, Neck pain x 1 day     HISTORY OF THE PRESENT ILLNESS: Patient is a 45 y.o. female. This pleasant patient is here today to discuss right shoulder pain for 6 months.    Health Maintenance: Completed.    Chronic right shoulder pain  New problem to examiner.  Chronic problem for the patient that began approximately 6 months ago.  Patient states that initially, she experienced a little pain in her right shoulder, but it has progressively worsened since March 2020.  Denies any injury or trauma to the right shoulder to cause pain.  States that she was not seen sooner due to COVID-19 concerns.  She has not tried any over-the-counter medications for pain management.  Reports that she tries to move her right upper extremity and stretch to help with pain, but the pain continues to worsen.  Reports intermittent numbness in her right forearm, denies any tingling or weakness of her right upper extremity.  Reports that she will be returning to work this week, Thursday, as a  and is worried that she will be able to adequately do her job if she is unable to use her right upper extremity.  Current symptoms: pain at rest, pain with overhead activities, neck pain that started today, Pain at night, Numbness in right forearm  Treatment to date: None     Allergies: Patient has no known allergies.    No current [facility]-ordered outpatient medications on file.     No current [facility]-ordered facility-administered medications on file.      Past Medical History:   Diagnosis Date   • Eczema      Past Surgical History:   Procedure Laterality Date   • BIOPSY GENERAL Right 2005    mole excision right ankle   • CYST EXCISION Left 1997    inferior to ear     Social History     Tobacco Use   • Smoking status: Never Smoker   • Smokeless tobacco: Never Used   Substance Use Topics   • Alcohol use: No   • Drug use: No     Social History     Social History  "Narrative   • Not on file     Family History   Problem Relation Age of Onset   • Diabetes Mother    • Heart Disease Father    • Alcohol/Drug Father    • No Known Problems Brother    • No Known Problems Maternal Grandmother    • Cancer Maternal Grandfather    • No Known Problems Paternal Grandmother    • No Known Problems Paternal Grandfather    • No Known Problems Daughter    • No Known Problems Son      ROS:   Constitutional:  Negative for fever, chills, unexpected weight change, night sweats, body aches, sleep issues, and fatigue/generalized weakness.   Respiratory:  Negative for cough, shortness of breath, sputum production, hemoptysis, chest congestion, dyspnea, wheezing, and crackles.    Cardiovascular:  Negative for chest pain, palpitations, MOYA, paroxsymal nocturnal dyspnea, orthopnea, and bilateral lower extremity edema.   Musculoskeletal:  Negative for myalgias, back pain, and joint pain.   Skin:  Negative for rash, sores, lumps, itching, cyanotic skin color change.   Psychiatric/Behavioral: Negative for depression, suicidal/homicidal ideation and memory loss.        Exam: /82 (BP Location: Left arm, Patient Position: Sitting, BP Cuff Size: Adult)   Pulse 67   Temp 36.9 °C (98.5 °F) (Temporal)   Resp 14   Ht 1.6 m (5' 3\")   Wt 71.7 kg (158 lb)   SpO2 96%  Body mass index is 27.99 kg/m².    General: Well nourished, well developed female in NAD, awake and conversant.  Eyes: Normal conjunctiva, anicteric.  Round symmetrical pupils.  ENT: Hearing grossly intact.  No nasal discharge.  Neck: Neck is supple.  No masses or thyromegaly.  CV: No lower extremity edema.  Respiratory: Respirations are nonlabored.  No wheezing.  Abdomen: Non-Distended.  Skin: Warm.  No rashes or ulcers.  MSK: Normal ambulation.  No clubbing or cyanosis.  Shoulder:  shoulder appears normal  non-specific diffuse tenderness about the shoulder  has tenderness about the glenohumeral joint  has tenderness over the AC joint  does " not have  full ROM  - crepitus with ROM  sensory exam normal  motor exam normal  radial pulse intact  Neuro: Sensation and CN II-XII grossly normal.  Psych: Alert and oriented.  Cooperative, appropriate mood and affect, normal judgment.     Assessment/Plan:  1. Chronic right shoulder pain  Chronic, worsening over the last 6 months without injury.  Plan to complete right shoulder x-ray.  Referral to orthopedics.  Advised Ibuprofen 600mg tid with food x 3 days then PRN, ice/heat to painful area prn, avoiding activities that increase discomfort.  - REFERRAL TO ORTHOPEDICS  - DX-SHOULDER 2+ RIGHT; Future    2. Encounter for screening mammogram for breast cancer  Due for screening.  - MA-SCREENING MAMMO BILAT W/CAD; Future    3. Routine health maintenance  Due for annual labs.  - CBC WITH DIFFERENTIAL; Future  - Comp Metabolic Panel; Future  - Lipid Profile; Future  - TSH WITH REFLEX TO FT4; Future    Educated in proper administration of medication(s) ordered today including safety, possible SE, risks, benefits, rationale and alternatives to therapy.   Supportive care, differential diagnoses, and indications for immediate follow-up discussed with patient.    Pathogenesis of diagnosis discussed including typical length and natural progression.    Instructed to return to clinic or nearest emergency department for any change in condition, further concerns, or worsening of symptoms.  Patient states understanding of the plan of care and discharge instructions.    Return in about 6 days (around 7/20/2020) for Preventative Annual.     Please note that this dictation was created using voice recognition software. I have made every reasonable attempt to correct obvious errors, but I expect that there are errors of grammar and possibly content that I did not discover before finalizing the note.

## 2020-07-14 NOTE — ASSESSMENT & PLAN NOTE
New problem to examiner.  Chronic problem for the patient that began approximately 6 months ago.  Patient states that initially, she experienced a little pain in her right shoulder, but it has progressively worsened since March 2020.  Denies any injury or trauma to the right shoulder to cause pain.  States that she was not seen sooner due to COVID-19 concerns.  She has not tried any over-the-counter medications for pain management.  Reports that she tries to move her right upper extremity and stretch to help with pain, but the pain continues to worsen.  Reports intermittent numbness in her right forearm, denies any tingling or weakness of her right upper extremity.  Reports that she will be returning to work this week, Thursday, as a  and is worried that she will be able to adequately do her job if she is unable to use her right upper extremity.  Current symptoms: pain at rest, pain with overhead activities, neck pain that started today, Pain at night, Numbness in right forearm  Treatment to date: None

## 2020-07-17 ENCOUNTER — HOSPITAL ENCOUNTER (OUTPATIENT)
Dept: LAB | Facility: MEDICAL CENTER | Age: 45
End: 2020-07-17
Attending: NURSE PRACTITIONER
Payer: COMMERCIAL

## 2020-07-17 DIAGNOSIS — Z00.00 ROUTINE HEALTH MAINTENANCE: ICD-10-CM

## 2020-07-17 LAB
FORWARD REASON: SPWHY: NORMAL
FORWARDED TO LAB: SPWHR: NORMAL
SPECIMEN SENT (2ND): SPWT2: NORMAL
SPECIMEN SENT (3RD): SPWT3: NORMAL
SPECIMEN SENT: SPWT1: NORMAL

## 2020-07-20 ENCOUNTER — APPOINTMENT (OUTPATIENT)
Dept: MEDICAL GROUP | Facility: PHYSICIAN GROUP | Age: 45
End: 2020-07-20
Payer: COMMERCIAL

## 2020-07-28 ENCOUNTER — HOSPITAL ENCOUNTER (OUTPATIENT)
Dept: RADIOLOGY | Facility: MEDICAL CENTER | Age: 45
End: 2020-07-28
Attending: NURSE PRACTITIONER
Payer: COMMERCIAL

## 2020-07-28 DIAGNOSIS — Z12.31 ENCOUNTER FOR SCREENING MAMMOGRAM FOR BREAST CANCER: ICD-10-CM

## 2020-07-28 PROCEDURE — 77067 SCR MAMMO BI INCL CAD: CPT

## 2020-08-16 ENCOUNTER — OFFICE VISIT (OUTPATIENT)
Dept: URGENT CARE | Facility: PHYSICIAN GROUP | Age: 45
End: 2020-08-16
Payer: COMMERCIAL

## 2020-08-16 VITALS
OXYGEN SATURATION: 93 % | SYSTOLIC BLOOD PRESSURE: 142 MMHG | TEMPERATURE: 96.8 F | HEIGHT: 64 IN | RESPIRATION RATE: 18 BRPM | HEART RATE: 95 BPM | BODY MASS INDEX: 26.29 KG/M2 | DIASTOLIC BLOOD PRESSURE: 70 MMHG | WEIGHT: 154 LBS

## 2020-08-16 DIAGNOSIS — N93.9 VAGINAL BLEEDING: ICD-10-CM

## 2020-08-16 DIAGNOSIS — R10.2 PELVIC PAIN: ICD-10-CM

## 2020-08-16 PROCEDURE — 99204 OFFICE O/P NEW MOD 45 MIN: CPT | Performed by: INTERNAL MEDICINE

## 2020-08-16 RX ORDER — MEDROXYPROGESTERONE ACETATE 10 MG/1
10 TABLET ORAL DAILY
Qty: 7 TAB | Refills: 0 | Status: SHIPPED | OUTPATIENT
Start: 2020-08-16 | End: 2020-08-31 | Stop reason: SDUPTHER

## 2020-08-16 ASSESSMENT — ENCOUNTER SYMPTOMS
DIZZINESS: 0
NAUSEA: 0
FEVER: 0
VOMITING: 0

## 2020-08-16 NOTE — PROGRESS NOTES
Subjective:     Constantin Méndez is a 45 y.o. female who presents for Vaginal Bleeding (non-stop since 7-)       Pelvic Pain  This is a new problem. The current episode started 1 to 4 weeks ago. The problem occurs intermittently. The problem has been waxing and waning. Pertinent negatives include no fever, nausea, urinary symptoms or vomiting.     Past Medical History:   Diagnosis Date   • Eczema      Past Surgical History:   Procedure Laterality Date   • BIOPSY GENERAL Right 2005    mole excision right ankle   • CYST EXCISION Left 1997    inferior to ear     Social History     Socioeconomic History   • Marital status:      Spouse name: Karely   • Number of children: Not on file   • Years of education: Not on file   • Highest education level: Not on file   Occupational History   • Not on file   Social Needs   • Financial resource strain: Not on file   • Food insecurity     Worry: Not on file     Inability: Not on file   • Transportation needs     Medical: Not on file     Non-medical: Not on file   Tobacco Use   • Smoking status: Never Smoker   • Smokeless tobacco: Never Used   Substance and Sexual Activity   • Alcohol use: No   • Drug use: No   • Sexual activity: Not Currently     Partners: Male   Lifestyle   • Physical activity     Days per week: Not on file     Minutes per session: Not on file   • Stress: Not on file   Relationships   • Social connections     Talks on phone: Not on file     Gets together: Not on file     Attends Rastafari service: Not on file     Active member of club or organization: Not on file     Attends meetings of clubs or organizations: Not on file     Relationship status: Not on file   • Intimate partner violence     Fear of current or ex partner: Not on file     Emotionally abused: Not on file     Physically abused: Not on file     Forced sexual activity: Not on file   Other Topics Concern   • Not on file   Social History Narrative   • Not on file      Family History   Problem  "Relation Age of Onset   • Diabetes Mother    • Heart Disease Father    • Alcohol/Drug Father    • No Known Problems Brother    • No Known Problems Maternal Grandmother    • Cancer Maternal Grandfather    • No Known Problems Paternal Grandmother    • No Known Problems Paternal Grandfather    • No Known Problems Daughter    • No Known Problems Son     Review of Systems   Constitutional: Negative for fever.   Gastrointestinal: Negative for nausea and vomiting.   Genitourinary: Positive for pelvic pain.        Vaginal bleeding   Neurological: Negative for dizziness.   All other systems reviewed and are negative.  No Known Allergies   Objective:   /70   Pulse 95   Temp 36 °C (96.8 °F) (Temporal)   Resp 18   Ht 1.626 m (5' 4\")   Wt 69.9 kg (154 lb)   SpO2 93%   BMI 26.43 kg/m²   Physical Exam  Constitutional:       General: She is not in acute distress.     Appearance: She is well-developed.   HENT:      Head: Normocephalic and atraumatic.      Mouth/Throat:      Mouth: Mucous membranes are moist.      Pharynx: Oropharynx is clear.   Eyes:      Conjunctiva/sclera: Conjunctivae normal.   Neck:      Musculoskeletal: No neck rigidity.   Cardiovascular:      Rate and Rhythm: Normal rate and regular rhythm.   Pulmonary:      Effort: Pulmonary effort is normal. No respiratory distress.      Breath sounds: Normal breath sounds.   Abdominal:      General: There is no distension.      Palpations: Abdomen is soft.      Tenderness: There is abdominal tenderness (mild RLQ tend). There is no right CVA tenderness, left CVA tenderness or guarding.   Lymphadenopathy:      Cervical: No cervical adenopathy.   Skin:     General: Skin is warm and dry.      Capillary Refill: Capillary refill takes less than 2 seconds.   Neurological:      Mental Status: She is alert and oriented to person, place, and time.      Sensory: No sensory deficit.      Deep Tendon Reflexes: Reflexes are normal and symmetric.   Psychiatric:         Mood " and Affect: Mood normal.         Behavior: Behavior normal.           Assessment/Plan:   Assessment    1. Vaginal bleeding  - medroxyPROGESTERone (PROVERA) 10 MG Tab; Take 1 Tab by mouth every day.  Dispense: 7 Tab; Refill: 0  - US-PELVIC COMPLETE (TRANSABDOMINAL/TRANSVAGINAL) (COMBO); Future    2. Pelvic pain  - US-PELVIC COMPLETE (TRANSABDOMINAL/TRANSVAGINAL) (COMBO); Future      Written by Que Vogt M.D. on 8/20/2020  8:27 AM  Hi Min,     I called you and left a message and hope you are feeling better       Your US results came back and it shows fibroid and ovarian cysts, cyst on the left ovary needs a repeat ultrasound and follow-up with gynecology and PCP  US-PELVIC COMPLETE (TRANSABDOMINAL/TRANSVAGINAL) (COMBO)  Order: [id number]  Status:  Final result   Visible to patient:  Yes (MyChart) Next appt:  08/31/2020 at 07:20 AM [facility] (Johanny Henry, A.P.R.N.) Dx:  Vaginal bleeding; Pelvic pain  Details    Reading Physician Reading Date Result Priority   Wayne Kathleen M.D.  [phone] 8/17/2020 STAT      Narrative & Impression        8/17/2020 4:39 PM     HISTORY/REASON FOR EXAM:  Vaginal Bleeding        TECHNIQUE/EXAM DESCRIPTION:  Transabdominal and transvaginal pelvic ultrasound.     COMPARISON:   None     FINDINGS:  Both transabdominal and transvaginal scanning were performed to optimally visualize the pelvis.     The uterus measures 6.5 cm x 10.5 cm x 7.3 cm.  The uterine myometrium is heterogeneous.  There is a 2.1 x 2.1 x 2.2 cm mass within the anterior fundus consistent with fibroid.  The endometrial echo complex measures 0.6 cm.  There are ill-defined echogenic striations near the endometrial complex which could represent adenomyosis.  A nabothian cyst is located within the endocervix.     Low resistive waveforms are confirmed within the ovaries.  The right ovary measures 5.6 cm x 3.4 cm x 4.1 cm.  There is a 5.6 x 3.4 x 4.1 cm cyst within right ovary. No internal septations or  nodularity.     The left ovary measures 2.5 cm x 1.8 cm x 2.0 cm.     There is a 0.9 x 0.9 x 0.8 cm hypoechoic lesion with diffuse internal echoes consistent with complex cyst. No internal vascularity identified.     There is no free fluid seen.     IMPRESSION:     The endometrial stripe measures 6 mm in thickness.  Possible adenomyosis.  2.2 cm fibroid anterior fundus of the uterus.  5.6 cm simple right ovarian cyst.  0.9 cm complex left ovarian cystic mass. Interval follow-up recommended.             Differential diagnosis, natural history, supportive care, and indications for immediate follow-up discussed.

## 2020-08-17 ENCOUNTER — HOSPITAL ENCOUNTER (OUTPATIENT)
Dept: RADIOLOGY | Facility: MEDICAL CENTER | Age: 45
End: 2020-08-17
Attending: INTERNAL MEDICINE
Payer: COMMERCIAL

## 2020-08-17 DIAGNOSIS — R10.2 PELVIC PAIN: ICD-10-CM

## 2020-08-17 DIAGNOSIS — N93.9 VAGINAL BLEEDING: ICD-10-CM

## 2020-08-17 PROCEDURE — 76830 TRANSVAGINAL US NON-OB: CPT

## 2020-08-20 ENCOUNTER — OFFICE VISIT (OUTPATIENT)
Dept: URGENT CARE | Facility: PHYSICIAN GROUP | Age: 45
End: 2020-08-20
Payer: COMMERCIAL

## 2020-08-20 VITALS
RESPIRATION RATE: 13 BRPM | BODY MASS INDEX: 26.29 KG/M2 | WEIGHT: 154 LBS | OXYGEN SATURATION: 99 % | SYSTOLIC BLOOD PRESSURE: 104 MMHG | HEART RATE: 79 BPM | DIASTOLIC BLOOD PRESSURE: 62 MMHG | TEMPERATURE: 97.2 F | HEIGHT: 64 IN

## 2020-08-20 DIAGNOSIS — D25.9 UTERINE LEIOMYOMA, UNSPECIFIED LOCATION: ICD-10-CM

## 2020-08-20 DIAGNOSIS — N83.201 BILATERAL OVARIAN CYSTS: ICD-10-CM

## 2020-08-20 DIAGNOSIS — N93.9 VAGINAL BLEEDING: ICD-10-CM

## 2020-08-20 DIAGNOSIS — N83.202 BILATERAL OVARIAN CYSTS: ICD-10-CM

## 2020-08-20 PROCEDURE — 99214 OFFICE O/P EST MOD 30 MIN: CPT | Performed by: INTERNAL MEDICINE

## 2020-08-20 ASSESSMENT — ENCOUNTER SYMPTOMS
FEVER: 0
NAUSEA: 0
ABDOMINAL PAIN: 0
VOMITING: 0

## 2020-08-20 NOTE — PROGRESS NOTES
"Subjective:   Constantin Méndez is a 45 y.o. female who presents for Follow-Up (vaginal bleeding and U/S results)  She is the bleeding is improved but still bleeding and is been going on for 3 weeks now,    No fever or chills, no pelvic pain now, no urinary symptoms          Other  This is a new problem. The current episode started 1 to 4 weeks ago. The problem occurs constantly. The problem has been gradually improving. Pertinent negatives include no abdominal pain, fever, nausea, urinary symptoms or vomiting.     Review of Systems   Constitutional: Negative for fever.   Gastrointestinal: Negative for abdominal pain, nausea and vomiting.   All other systems reviewed and are negative.    No Known Allergies   Objective:   /62   Pulse 79   Temp 36.2 °C (97.2 °F)   Resp 13   Ht 1.626 m (5' 4\")   Wt 69.9 kg (154 lb)   SpO2 99%   BMI 26.43 kg/m²   Physical Exam  Vitals signs reviewed.   Constitutional:       General: She is not in acute distress.     Appearance: She is well-developed.   HENT:      Head: Normocephalic and atraumatic.   Eyes:      Conjunctiva/sclera: Conjunctivae normal.   Cardiovascular:      Rate and Rhythm: Normal rate and regular rhythm.   Pulmonary:      Effort: Pulmonary effort is normal.      Breath sounds: Normal breath sounds.   Abdominal:      General: There is no distension.      Tenderness: There is no abdominal tenderness. There is no right CVA tenderness, left CVA tenderness or guarding.   Skin:     General: Skin is warm and dry.   Neurological:      Mental Status: She is alert and oriented to person, place, and time.      Sensory: No sensory deficit.   Psychiatric:         Thought Content: Thought content normal.           Assessment/Plan:   1. Vaginal bleeding  - REFERRAL TO GYNECOLOGY    2. Bilateral ovarian cysts  - REFERRAL TO GYNECOLOGY    3. Uterine leiomyoma, unspecified location  - REFERRAL TO GYNECOLOGY     US-    IMPRESSION:     The endometrial stripe measures 6 mm in " thickness.  Possible adenomyosis.  2.2 cm fibroid anterior fundus of the uterus.  5.6 cm simple right ovarian cyst.  0.9 cm complex left ovarian cystic mass. Interval follow-up recommended.        Continue Provera 10 mg    Counseled her about the ultrasound report and need for follow-up with gynecology      Differential diagnosis, natural history, supportive care, and indications for immediate follow-up discussed.

## 2020-08-31 ENCOUNTER — TELEPHONE (OUTPATIENT)
Dept: OBGYN | Facility: CLINIC | Age: 45
End: 2020-08-31

## 2020-08-31 ENCOUNTER — TELEMEDICINE (OUTPATIENT)
Dept: MEDICAL GROUP | Facility: PHYSICIAN GROUP | Age: 45
End: 2020-08-31
Payer: COMMERCIAL

## 2020-08-31 VITALS
HEART RATE: 74 BPM | HEIGHT: 64 IN | DIASTOLIC BLOOD PRESSURE: 72 MMHG | BODY MASS INDEX: 26.12 KG/M2 | TEMPERATURE: 97 F | SYSTOLIC BLOOD PRESSURE: 118 MMHG | RESPIRATION RATE: 16 BRPM | OXYGEN SATURATION: 94 % | WEIGHT: 153 LBS

## 2020-08-31 DIAGNOSIS — N92.6 IRREGULAR MENSES: ICD-10-CM

## 2020-08-31 DIAGNOSIS — N93.9 VAGINAL BLEEDING: ICD-10-CM

## 2020-08-31 DIAGNOSIS — E78.5 DYSLIPIDEMIA: ICD-10-CM

## 2020-08-31 PROCEDURE — 99214 OFFICE O/P EST MOD 30 MIN: CPT | Performed by: NURSE PRACTITIONER

## 2020-08-31 RX ORDER — MEDROXYPROGESTERONE ACETATE 10 MG/1
10 TABLET ORAL DAILY
Qty: 10 TAB | Refills: 0 | Status: SHIPPED | OUTPATIENT
Start: 2020-08-31 | End: 2020-09-10

## 2020-08-31 NOTE — ASSESSMENT & PLAN NOTE
New problem to examiner, ongoing.  She was seen twice at urgent care for continuous menstrual bleeding.  She took 7 days of Provera and reports that this lessened her bleeding, but the bleeding has been getting worse since finishing this prescription.  She has not made an appointment with OB/GYN yet.  She is using about 3 tampons per day.  She attempted to do exercise once since she started having this bleeding, but she experienced uterine cramping and increased bleeding associated stopped her attempted exercise.  She denies other systemic symptoms such as fever, unexplained fatigue, malaise, weight loss, breast tenderness, breast discharge, other breast changes, or swollen lymph unexplained fatigue nodes.

## 2020-08-31 NOTE — PROGRESS NOTES
CC:   Chief Complaint   Patient presents with   • Lab Results     HISTORY OF THE PRESENT ILLNESS: Patient is a 45 y.o. female. This pleasant patient is here today to discuss irregular menstrual bleeding and review labs.    Health Maintenance: Completed.    Irregular menses  New problem to examiner, ongoing.  She was seen twice at urgent care for continuous menstrual bleeding.  She took 7 days of Provera and reports that this lessened her bleeding, but the bleeding has been getting worse since finishing this prescription.  She has not made an appointment with OB/GYN yet.  She is using about 3 tampons per day.  She attempted to do exercise once since she started having this bleeding, but she experienced uterine cramping and increased bleeding associated stopped her attempted exercise.  She denies other systemic symptoms such as fever, unexplained fatigue, malaise, weight loss, breast tenderness, breast discharge, other breast changes, or swollen lymph unexplained fatigue nodes.    Dyslipidemia  New problem to examiner.  Her last lipid profile in July 2020 showed increased LDL cholesterol (132), and decreased HDL cholesterol (46).  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  She says that she is not eating regular meals, and has not been able to exercise lately because of her vaginal bleeding.    Allergies: Patient has no known allergies.    Current Outpatient Medications Ordered in Epic   Medication Sig Dispense Refill   • medroxyPROGESTERone (PROVERA) 10 MG Tab Take 1 Tab by mouth every day for 10 days. 10 Tab 0     No current Epic-ordered facility-administered medications on file.      Past Medical History:   Diagnosis Date   • Eczema      Past Surgical History:   Procedure Laterality Date   • BIOPSY GENERAL Right 2005    mole excision right ankle   • CYST EXCISION Left 1997    inferior to ear     Social History     Tobacco Use   •  "Smoking status: Never Smoker   • Smokeless tobacco: Never Used   Substance Use Topics   • Alcohol use: No   • Drug use: No     Social History     Social History Narrative   • Not on file     Family History   Problem Relation Age of Onset   • Diabetes Mother    • Heart Disease Father    • Alcohol/Drug Father    • No Known Problems Brother    • No Known Problems Maternal Grandmother    • Cancer Maternal Grandfather    • No Known Problems Paternal Grandmother    • No Known Problems Paternal Grandfather    • No Known Problems Daughter    • No Known Problems Son      ROS:  Constitutional: No fevers, chills, malaise/fatigue.  Eyes: No eye pain.  ENT: No sore throat, congestion.   Resp: No cough, shortness of breath.  CV: No chest pain, leg swelling, palpitations.  GI: No nausea/vomiting, abdominal pain, constipation, diarrhea.  : + Irregular menstrual bleeding.  No dysuria, hematuria.  MSK: No weakness.  Skin: No rashes.  Neuro: No dizziness, weakness, headaches.  Psych: No suicidal ideations.    All remaining systems reviewed and found to be negative, except as stated above.       Exam: /72 (BP Location: Left arm, Patient Position: Sitting, BP Cuff Size: Adult)   Pulse 74   Temp 36.1 °C (97 °F) (Temporal)   Resp 16   Ht 1.626 m (5' 4\")   Wt 69.4 kg (153 lb)   SpO2 94%  Body mass index is 26.26 kg/m².    General: Well nourished, well developed female in NAD, awake and conversant.  Eyes: Normal conjunctiva, anicteric.  Round symmetrical pupils.  ENT: Hearing grossly intact.  No nasal discharge.  Neck: Neck is supple.  No masses or thyromegaly.  CV: No lower extremity edema.  Respiratory: Respirations are nonlabored.  No wheezing.  Abdomen: Non-Distended.  Skin: Warm.  No rashes or ulcers.  MSK: Normal ambulation.  No clubbing or cyanosis.  Neuro: Sensation and CN II-XII grossly normal.  Psych: Alert and oriented.  Cooperative, appropriate mood and affect, normal judgment.    Assessment/Plan:  1. Irregular " menses  2. Vaginal bleeding  New problem to examiner.  Will continue Provera 10 mg/day for 10 days.  Patient to schedule with OB/GYN, referral information given to patient.  - medroxyPROGESTERone (PROVERA) 10 MG Tab; Take 1 Tab by mouth every day for 10 days.  Dispense: 10 Tab; Refill: 0    3. Dyslipidemia  New problem to examiner.  Counseled on lifestyle changes including diet and exercise.  Plan to recheck labs in 1 year.    Educated in proper administration of medication(s) ordered today including safety, possible SE, risks, benefits, rationale and alternatives to therapy.   Supportive care, differential diagnoses, and indications for immediate follow-up discussed with patient.    Pathogenesis of diagnosis discussed including typical length and natural progression.    Instructed to return to clinic or nearest emergency department for any change in condition, further concerns, or worsening of symptoms.  Patient states understanding of the plan of care and discharge instructions.    Return in about 1 year (around 8/31/2021) for Preventative Annual.     Please note that this dictation was created using voice recognition software. I have made every reasonable attempt to correct obvious errors, but I expect that there are errors of grammar and possibly content that I did not discover before finalizing the note.

## 2020-08-31 NOTE — ASSESSMENT & PLAN NOTE
New problem to examiner.  Her last lipid profile in July 2020 showed increased LDL cholesterol (132), and decreased HDL cholesterol (46).  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  She says that she is not eating regular meals, and has not been able to exercise lately because of her vaginal bleeding.

## 2020-09-02 ENCOUNTER — HOSPITAL ENCOUNTER (OUTPATIENT)
Facility: MEDICAL CENTER | Age: 45
End: 2020-09-02
Attending: OBSTETRICS & GYNECOLOGY
Payer: COMMERCIAL

## 2020-09-02 ENCOUNTER — GYNECOLOGY VISIT (OUTPATIENT)
Dept: OBGYN | Facility: CLINIC | Age: 45
End: 2020-09-02
Payer: COMMERCIAL

## 2020-09-02 VITALS — BODY MASS INDEX: 26.09 KG/M2 | WEIGHT: 152 LBS

## 2020-09-02 DIAGNOSIS — N93.9 ABNORMAL UTERINE BLEEDING (AUB): ICD-10-CM

## 2020-09-02 DIAGNOSIS — Z30.42 DEPO-PROVERA CONTRACEPTIVE STATUS: ICD-10-CM

## 2020-09-02 LAB
INT CON NEG: NEGATIVE
INT CON POS: POSITIVE
PATHOLOGY CONSULT NOTE: NORMAL
POC URINE PREGNANCY TEST: NEGATIVE

## 2020-09-02 PROCEDURE — 88305 TISSUE EXAM BY PATHOLOGIST: CPT

## 2020-09-02 PROCEDURE — 99203 OFFICE O/P NEW LOW 30 MIN: CPT | Mod: 25 | Performed by: OBSTETRICS & GYNECOLOGY

## 2020-09-02 PROCEDURE — 81025 URINE PREGNANCY TEST: CPT | Performed by: OBSTETRICS & GYNECOLOGY

## 2020-09-02 PROCEDURE — 58100 BIOPSY OF UTERUS LINING: CPT | Performed by: OBSTETRICS & GYNECOLOGY

## 2020-09-02 PROCEDURE — 96372 THER/PROPH/DIAG INJ SC/IM: CPT | Mod: 59 | Performed by: OBSTETRICS & GYNECOLOGY

## 2020-09-02 RX ORDER — MEDROXYPROGESTERONE ACETATE 150 MG/ML
150 INJECTION, SUSPENSION INTRAMUSCULAR
Status: DISCONTINUED | OUTPATIENT
Start: 2020-09-02 | End: 2020-11-22

## 2020-09-02 RX ADMIN — MEDROXYPROGESTERONE ACETATE 150 MG: 150 INJECTION, SUSPENSION INTRAMUSCULAR at 15:59

## 2020-09-02 NOTE — PROCEDURES
Procedures      EMB:  Cervix cleansed with betadine x3.  Ant lip grasped with tenaculum.  Pipelle passed easily through cervix, uterus sounded to 10cm.  4 passes were made with adequate collection of tissue.  Specimen labeled and sent to pathology. All instruments removed with hemostasis noted.  Pt tolerated well.    Christy Fierro MD  [facility], Women's Health

## 2020-09-02 NOTE — NON-PROVIDER
Pt here for new pt appt  Pt states that she has been bleeding for one month  Pharmacy verified  Good #:  LMP:July 2020  PAP:05/2019 WNL  MAMMO:07/2020 Benign    Depo injection   Due 11/18-12/02/2020  Right Deltoid  Consent signed  Pregnancy test neg

## 2020-09-02 NOTE — PROGRESS NOTES
GYN Visit    CC: vaginal bleeding    Mandarin  #[id number] used for visit  HPI: Constantin Méndez is a 45 y.o.  with vaginal bleeding for the past 1 month continuously.  Reports that she most uses 3-4 pads per day, sometimes letter and did increase to close to 1 pad per hour and went to urgent care.  Was given provera which she took and slowed it down but didn't stop it.  Took the Provera for 5 days.  Got heavier again and currently using 3-4 pads per day.  Has never had this before, reports monthly regular menses previously.  No pain.  Not currently sexually active.      ROS:  12 system review of systems performed and negative except as above in HPI.  See intake form for details    OB History    Para Term  AB Living   4 2 2   2 2   SAB TAB Ectopic Molar Multiple Live Births             2      # Outcome Date GA Lbr Kapil/2nd Weight Sex Delivery Anes PTL Lv   4 AB            3 AB            2 Term     M Vag-Spont   GAVINO   1 Term     F Vag-Spont   GAVINO       GYN Hx:   Monthly menses prior to recent changes  Denies hx of STIs  Denies hx of abnl paps, last pap 2019 NILM/HPV neg     Past Medical History:   Diagnosis Date   • Eczema    • Hyperlipidemia        Past Surgical History:   Procedure Laterality Date   • BIOPSY GENERAL Right 2005    mole excision right ankle   • CYST EXCISION Left     inferior to ear       Medications:   None    Allergies: Patient has no known allergies.    Social History     Tobacco Use   • Smoking status: Never Smoker   • Smokeless tobacco: Never Used   Substance Use Topics   • Alcohol use: No   • Drug use: No         Family History   Problem Relation Age of Onset   • Diabetes Mother    • Heart Disease Father    • Alcohol/Drug Father    • No Known Problems Brother    • No Known Problems Maternal Grandmother    • Cancer Maternal Grandfather    • No Known Problems Paternal Grandmother    • No Known Problems Paternal Grandfather    • No Known Problems Daughter    • No Known  Problems Son          Physical Exam:  Wt 68.9 kg (152 lb)   BMI 26.09 kg/m²   gen: AAO, NAD, mood and affect appropriate  abd: soft, NT, ND, no masses, no organomegaly, no hernias  : NEFG, normal urethral meatus, normal anus/perineum, normal vagina and cervix.  Uterus 10wk sized, anteverted, no adnexal masses/tenderness  Skin: warm/dry, no lesions    Pelvic US:  2020 4:39 PM     HISTORY/REASON FOR EXAM:  Vaginal Bleeding        TECHNIQUE/EXAM DESCRIPTION:  Transabdominal and transvaginal pelvic ultrasound.     COMPARISON:   None     FINDINGS:  Both transabdominal and transvaginal scanning were performed to optimally visualize the pelvis.     The uterus measures 6.5 cm x 10.5 cm x 7.3 cm.  The uterine myometrium is heterogeneous.  There is a 2.1 x 2.1 x 2.2 cm mass within the anterior fundus consistent with fibroid.  The endometrial echo complex measures 0.6 cm.  There are ill-defined echogenic striations near the endometrial complex which could represent adenomyosis.  A nabothian cyst is located within the endocervix.     Low resistive waveforms are confirmed within the ovaries.  The right ovary measures 5.6 cm x 3.4 cm x 4.1 cm.  There is a 5.6 x 3.4 x 4.1 cm cyst within right ovary. No internal septations or nodularity.     The left ovary measures 2.5 cm x 1.8 cm x 2.0 cm.     There is a 0.9 x 0.9 x 0.8 cm hypoechoic lesion with diffuse internal echoes consistent with complex cyst. No internal vascularity identified.     There is no free fluid seen.     IMPRESSION:     The endometrial stripe measures 6 mm in thickness.  Possible adenomyosis.  2.2 cm fibroid anterior fundus of the uterus.  5.6 cm simple right ovarian cyst.  0.9 cm complex left ovarian cystic mass. Interval follow-up recommended.      A/P: 45 y.o.  with AUB, ovarian cysts  1. Abnormal uterine bleeding (AUB)  CBC WITHOUT DIFFERENTIAL    FSH    PROLACTIN    US-PELVIC COMPLETE (TRANSABDOMINAL/TRANSVAGINAL) (COMBO)    Consent for all  Surgical, Special Diagnostic or Therapeutic Procedures    POCT Pregnancy    Pathology Specimen   2. Depo-Provera contraceptive status       Patient with recent normal thyroid testing.  Recommend CBC given the heaviness in length of bleeding that she is having, will also check prolactin and FSH.  Recommend endometrial biopsy to evaluate for endometrial abnormalities which patient is accepting of having done today.  See separate procedure note for details.    Discussed that assuming her pathology is normal, most likely her bleeding is related to anovulation and perimenopause.  Discussed that first-line treatment for this would typically be hormonal suppression of her menstrual cycle.  Discussed that we can do this with pills, Mirena IUD is an excellent option, or also could consider Depo-Provera injection.  Patient prefers to try Depo-Provera which was administered in the office today.  Prior to administration, discussed that she should expect some irregular bleeding which typically improves with time but can continue.    Ovarian cysts on ultrasound, will follow-up with repeat ultrasound in 6 weeks    Return to clinic approximately 8 weeks after follow-up ultrasound.    We will notify patient of pathology.    Christy Fierro MD  [facility], Women's Health

## 2020-09-03 ENCOUNTER — TELEPHONE (OUTPATIENT)
Dept: OBGYN | Facility: CLINIC | Age: 45
End: 2020-09-03

## 2020-09-03 NOTE — TELEPHONE ENCOUNTER
Pt LM on VM requesting a call back  Called pt back, pt is c/o sore arm, she received Depo Shot. Informed pt normal to have arm a little sore and adv pt to move it around to decrease soreness. Pt understood and had no other questions

## 2020-09-17 ENCOUNTER — APPOINTMENT (OUTPATIENT)
Dept: RADIOLOGY | Facility: MEDICAL CENTER | Age: 45
End: 2020-09-17
Attending: OBSTETRICS & GYNECOLOGY
Payer: COMMERCIAL

## 2020-09-23 ENCOUNTER — HOSPITAL ENCOUNTER (OUTPATIENT)
Dept: RADIOLOGY | Facility: MEDICAL CENTER | Age: 45
End: 2020-09-23
Attending: OBSTETRICS & GYNECOLOGY
Payer: COMMERCIAL

## 2020-10-12 ENCOUNTER — APPOINTMENT (OUTPATIENT)
Dept: RADIOLOGY | Facility: MEDICAL CENTER | Age: 45
End: 2020-10-12
Attending: OBSTETRICS & GYNECOLOGY
Payer: COMMERCIAL

## 2020-10-13 ENCOUNTER — HOSPITAL ENCOUNTER (OUTPATIENT)
Dept: RADIOLOGY | Facility: MEDICAL CENTER | Age: 45
End: 2020-10-13
Attending: OBSTETRICS & GYNECOLOGY
Payer: COMMERCIAL

## 2020-10-13 DIAGNOSIS — N93.9 ABNORMAL UTERINE BLEEDING (AUB): ICD-10-CM

## 2020-10-13 PROCEDURE — 76830 TRANSVAGINAL US NON-OB: CPT

## 2020-11-19 ENCOUNTER — NON-PROVIDER VISIT (OUTPATIENT)
Dept: OBGYN | Facility: CLINIC | Age: 45
End: 2020-11-19
Payer: COMMERCIAL

## 2020-11-19 PROCEDURE — 96372 THER/PROPH/DIAG INJ SC/IM: CPT | Performed by: NURSE PRACTITIONER

## 2020-11-19 RX ADMIN — MEDROXYPROGESTERONE ACETATE 150 MG: 150 INJECTION, SUSPENSION INTRAMUSCULAR at 14:09

## 2020-11-22 ENCOUNTER — HOSPITAL ENCOUNTER (EMERGENCY)
Facility: MEDICAL CENTER | Age: 45
End: 2020-11-22
Attending: EMERGENCY MEDICINE
Payer: COMMERCIAL

## 2020-11-22 ENCOUNTER — APPOINTMENT (OUTPATIENT)
Dept: RADIOLOGY | Facility: MEDICAL CENTER | Age: 45
End: 2020-11-22
Attending: EMERGENCY MEDICINE
Payer: COMMERCIAL

## 2020-11-22 VITALS
DIASTOLIC BLOOD PRESSURE: 62 MMHG | OXYGEN SATURATION: 100 % | TEMPERATURE: 98 F | HEART RATE: 85 BPM | HEIGHT: 64 IN | BODY MASS INDEX: 26.57 KG/M2 | RESPIRATION RATE: 14 BRPM | SYSTOLIC BLOOD PRESSURE: 105 MMHG | WEIGHT: 155.65 LBS

## 2020-11-22 DIAGNOSIS — N93.8 DYSFUNCTIONAL UTERINE BLEEDING: ICD-10-CM

## 2020-11-22 LAB
ALBUMIN SERPL BCP-MCNC: 4 G/DL (ref 3.2–4.9)
ALBUMIN/GLOB SERPL: 1.7 G/DL
ALP SERPL-CCNC: 47 U/L (ref 30–99)
ALT SERPL-CCNC: 12 U/L (ref 2–50)
ANION GAP SERPL CALC-SCNC: 6 MMOL/L (ref 7–16)
AST SERPL-CCNC: 13 U/L (ref 12–45)
BASOPHILS # BLD AUTO: 0.9 % (ref 0–1.8)
BASOPHILS # BLD: 0.05 K/UL (ref 0–0.12)
BILIRUB SERPL-MCNC: 0.3 MG/DL (ref 0.1–1.5)
BUN SERPL-MCNC: 13 MG/DL (ref 8–22)
CALCIUM SERPL-MCNC: 8.1 MG/DL (ref 8.5–10.5)
CHLORIDE SERPL-SCNC: 111 MMOL/L (ref 96–112)
CO2 SERPL-SCNC: 21 MMOL/L (ref 20–33)
CREAT SERPL-MCNC: 0.65 MG/DL (ref 0.5–1.4)
EOSINOPHIL # BLD AUTO: 0.05 K/UL (ref 0–0.51)
EOSINOPHIL NFR BLD: 0.9 % (ref 0–6.9)
ERYTHROCYTE [DISTWIDTH] IN BLOOD BY AUTOMATED COUNT: 45.3 FL (ref 35.9–50)
GLOBULIN SER CALC-MCNC: 2.3 G/DL (ref 1.9–3.5)
GLUCOSE SERPL-MCNC: 104 MG/DL (ref 65–99)
HCT VFR BLD AUTO: 27.3 % (ref 37–47)
HGB BLD-MCNC: 8.5 G/DL (ref 12–16)
LIPASE SERPL-CCNC: 19 U/L (ref 11–82)
LYMPHOCYTES # BLD AUTO: 1.01 K/UL (ref 1–4.8)
LYMPHOCYTES NFR BLD: 18.1 % (ref 22–41)
MANUAL DIFF BLD: NORMAL
MCH RBC QN AUTO: 27.5 PG (ref 27–33)
MCHC RBC AUTO-ENTMCNC: 31.1 G/DL (ref 33.6–35)
MCV RBC AUTO: 88.3 FL (ref 81.4–97.8)
MONOCYTES # BLD AUTO: 0.19 K/UL (ref 0–0.85)
MONOCYTES NFR BLD AUTO: 3.4 % (ref 0–13.4)
MORPHOLOGY BLD-IMP: NORMAL
MYELOCYTES NFR BLD MANUAL: 0.9 %
NEUTROPHILS # BLD AUTO: 4.25 K/UL (ref 2–7.15)
NEUTROPHILS NFR BLD: 75.9 % (ref 44–72)
NRBC # BLD AUTO: 0 K/UL
NRBC BLD-RTO: 0 /100 WBC
PLATELET # BLD AUTO: 275 K/UL (ref 164–446)
PLATELET BLD QL SMEAR: NORMAL
PMV BLD AUTO: 9.7 FL (ref 9–12.9)
POTASSIUM SERPL-SCNC: 3.9 MMOL/L (ref 3.6–5.5)
PROT SERPL-MCNC: 6.3 G/DL (ref 6–8.2)
RBC # BLD AUTO: 3.09 M/UL (ref 4.2–5.4)
RBC BLD AUTO: NORMAL
SODIUM SERPL-SCNC: 138 MMOL/L (ref 135–145)
WBC # BLD AUTO: 5.6 K/UL (ref 4.8–10.8)

## 2020-11-22 PROCEDURE — 83690 ASSAY OF LIPASE: CPT

## 2020-11-22 PROCEDURE — 76856 US EXAM PELVIC COMPLETE: CPT

## 2020-11-22 PROCEDURE — 85007 BL SMEAR W/DIFF WBC COUNT: CPT

## 2020-11-22 PROCEDURE — 80053 COMPREHEN METABOLIC PANEL: CPT

## 2020-11-22 PROCEDURE — 85027 COMPLETE CBC AUTOMATED: CPT

## 2020-11-22 PROCEDURE — 99284 EMERGENCY DEPT VISIT MOD MDM: CPT

## 2020-11-22 NOTE — ED PROVIDER NOTES
"ED Provider Note    CHIEF COMPLAINT  Chief Complaint   Patient presents with   • Vaginal Bleeding     c/o vaginal bleeding the last two months but the last few days have worsened. Pt reports bleeding through numerous funmilayo pads yesterday after give a \"shot to help stop her bleeding\" [facility]. Reports dizziness and weakness.    • Nausea     x'1 days        HPI  Constantin Méndez is a 45 y.o. female who presents for the evaluation of crampy lower abdominal pain ongoing vaginal bleeding.  Patient is already followed by gynecology.  She is not pregnant.  She has a dysfunctional uterine bleeding for several months.  She has not required blood transfusion.  Ultrasound 6 weeks ago demonstrated a 2 cm fibroid.  Currently she was at the clinic last week and received a \"shot \"to help stop the bleeding.  She denies high fevers or chills.    REVIEW OF SYSTEMS  See HPI for further details.  No night sweats weight loss numbness tingling weakness rash all other systems are negative.     PAST MEDICAL HISTORY  Past Medical History:   Diagnosis Date   • Eczema    • Hyperlipidemia        FAMILY HISTORY  No history of bleeding disorder    SOCIAL HISTORY  Social History     Socioeconomic History   • Marital status:      Spouse name: Karely   • Number of children: Not on file   • Years of education: Not on file   • Highest education level: Not on file   Occupational History   • Not on file   Social Needs   • Financial resource strain: Not on file   • Food insecurity     Worry: Not on file     Inability: Not on file   • Transportation needs     Medical: Not on file     Non-medical: Not on file   Tobacco Use   • Smoking status: Never Smoker   • Smokeless tobacco: Never Used   Substance and Sexual Activity   • Alcohol use: No   • Drug use: No   • Sexual activity: Not Currently     Partners: Male   Lifestyle   • Physical activity     Days per week: Not on file     Minutes per session: Not on file   • Stress: Not on file " "  Relationships   • Social connections     Talks on phone: Not on file     Gets together: Not on file     Attends Nondenominational service: Not on file     Active member of club or organization: Not on file     Attends meetings of clubs or organizations: Not on file     Relationship status: Not on file   • Intimate partner violence     Fear of current or ex partner: Not on file     Emotionally abused: Not on file     Physically abused: Not on file     Forced sexual activity: Not on file   Other Topics Concern   • Not on file   Social History Narrative   • Not on file       SURGICAL HISTORY  Past Surgical History:   Procedure Laterality Date   • BIOPSY GENERAL Right 2005    mole excision right ankle   • CYST EXCISION Left 1997    inferior to ear       CURRENT MEDICATIONS  Home Medications     Reviewed by Lucero Beal R.N. (Registered Nurse) on 11/22/20 at [facility]  Med List Status: Not Addressed   Medication Last Dose Status        Patient Fletcher Taking any Medications                       ALLERGIES  No Known Allergies    PHYSICAL EXAM  VITAL SIGNS: /68   Pulse 85   Temp 36.4 °C (97.5 °F) (Temporal)   Resp 17   Ht 1.626 m (5' 4\")   Wt 70.6 kg (155 lb 10.3 oz)   SpO2 98%   BMI 26.72 kg/m²       Constitutional: Well developed, Well nourished, No acute distress, Non-toxic appearance.   HENT: Normocephalic, Atraumatic, Bilateral external ears normal, Oropharynx moist, No oral exudates, Nose normal.   Eyes: PERRLA, EOMI, Conjunctiva normal, No discharge.   Neck: Normal range of motion, No tenderness, Supple, No stridor.    Cardiovascular: Normal heart rate, Normal rhythm, No murmurs, No rubs, No gallops.   Thorax & Lungs: Normal breath sounds, No respiratory distress, No wheezing, No chest tenderness.   Abdomen: Bowel sounds normal, Soft, No tenderness, No masses, No pulsatile masses.   Skin: Warm, Dry, No erythema, No rash.   Back: No tenderness, No CVA tenderness.   Genitalia: Patient declined  Extremities: Intact " distal pulses, No edema, No tenderness, No cyanosis, No clubbing.   Neurologic: Alert & oriented x 3, Normal motor function, Normal sensory function, No focal deficits noted.   Psychiatric: Affect normal, Judgment normal, Mood normal.   Results for orders placed or performed during the hospital encounter of 11/22/20   CBC WITH DIFFERENTIAL   Result Value Ref Range    WBC 5.6 4.8 - 10.8 K/uL    RBC 3.09 (L) 4.20 - 5.40 M/uL    Hemoglobin 8.5 (L) 12.0 - 16.0 g/dL    Hematocrit 27.3 (L) 37.0 - 47.0 %    MCV 88.3 81.4 - 97.8 fL    MCH 27.5 27.0 - 33.0 pg    MCHC 31.1 (L) 33.6 - 35.0 g/dL    RDW 45.3 35.9 - 50.0 fL    Platelet Count 275 164 - 446 K/uL    MPV 9.7 9.0 - 12.9 fL    Neutrophils-Polys 75.90 (H) 44.00 - 72.00 %    Lymphocytes 18.10 (L) 22.00 - 41.00 %    Monocytes 3.40 0.00 - 13.40 %    Eosinophils 0.90 0.00 - 6.90 %    Basophils 0.90 0.00 - 1.80 %    Nucleated RBC 0.00 /100 WBC    Neutrophils (Absolute) 4.25 2.00 - 7.15 K/uL    Lymphs (Absolute) 1.01 1.00 - 4.80 K/uL    Monos (Absolute) 0.19 0.00 - 0.85 K/uL    Eos (Absolute) 0.05 0.00 - 0.51 K/uL    Baso (Absolute) 0.05 0.00 - 0.12 K/uL    NRBC (Absolute) 0.00 K/uL   COMP METABOLIC PANEL   Result Value Ref Range    Sodium 138 135 - 145 mmol/L    Potassium 3.9 3.6 - 5.5 mmol/L    Chloride 111 96 - 112 mmol/L    Co2 21 20 - 33 mmol/L    Anion Gap 6.0 (L) 7.0 - 16.0    Glucose 104 (H) 65 - 99 mg/dL    Bun 13 8 - 22 mg/dL    Creatinine 0.65 0.50 - 1.40 mg/dL    Calcium 8.1 (L) 8.5 - 10.5 mg/dL    AST(SGOT) 13 12 - 45 U/L    ALT(SGPT) 12 2 - 50 U/L    Alkaline Phosphatase 47 30 - 99 U/L    Total Bilirubin 0.3 0.1 - 1.5 mg/dL    Albumin 4.0 3.2 - 4.9 g/dL    Total Protein 6.3 6.0 - 8.2 g/dL    Globulin 2.3 1.9 - 3.5 g/dL    A-G Ratio 1.7 g/dL   LIPASE   Result Value Ref Range    Lipase 19 11 - 82 U/L   ESTIMATED GFR   Result Value Ref Range    GFR If African American >60 >60 mL/min/1.73 m 2    GFR If Non African American >60 >60 mL/min/1.73 m 2   PLATELET ESTIMATE    Result Value Ref Range    Plt Estimation Normal    MORPHOLOGY   Result Value Ref Range    RBC Morphology Normal    PERIPHERAL SMEAR REVIEW   Result Value Ref Range    Peripheral Smear Review see below    DIFFERENTIAL MANUAL   Result Value Ref Range    Myelocytes 0.90 %    Manual Diff Status PERFORMED       US-PELVIC COMPLETE (TRANSABDOMINAL/TRANSVAGINAL) (COMBO)   Final Result      1.  There is a small uterine fibroid again seen which appears to abut the endometrial complex.   2.  There is a resolving left ovarian cyst.          COURSE & MEDICAL DECISION MAKING  Pertinent Labs & Imaging studies reviewed. (See chart for details)  An IV is established.  Reviewed the patient's prior visit history as well as her ultrasound.  She has not had blood test for quite a while and her hemoglobin is slightly low at 8.5.  She does not have any signs of active bleeding currently.  Ultrasound was repeated and does not demonstrate any new or significant findings.  The patient is already followed by gynecology with Dr. Strauss.  I counseled her to follow-up with her gynecologist in 2 to 3 days    FINAL IMPRESSION  1.  Dysfunctional uterine bleeding         Electronically signed by: Kendrick Mariano M.D., 11/22/2020 10:41 AM

## 2020-11-22 NOTE — ED TRIAGE NOTES
"..  Chief Complaint   Patient presents with   • Vaginal Bleeding     c/o vaginal bleeding the last two months but the last few days have worsened. Pt reports bleeding through numerous funmilayo pads yesterday after give a \"shot to help stop her bleeding\" [facility] the [facility] clinic. Reports dizziness and weakness.    • Nausea     x'1 days          ./69   Pulse 92   Temp 36.4 °C (97.5 °F) (Temporal)   Resp 17   Ht 1.626 m (5' 4\")   Wt 70.6 kg (155 lb 10.3 oz)   SpO2 96%        Explained triage process, to waiting room. Asked to inform RN if questions or concerns arise.   "

## 2020-11-22 NOTE — ED NOTES
Provided patient with discharge instructions and referral. Pt verbalized understanding. Pt wheeled to the exit with daughter.

## 2020-11-24 ENCOUNTER — GYNECOLOGY VISIT (OUTPATIENT)
Dept: OBGYN | Facility: CLINIC | Age: 45
End: 2020-11-24
Payer: COMMERCIAL

## 2020-11-24 VITALS — SYSTOLIC BLOOD PRESSURE: 98 MMHG | BODY MASS INDEX: 26.91 KG/M2 | WEIGHT: 156.8 LBS | DIASTOLIC BLOOD PRESSURE: 66 MMHG

## 2020-11-24 DIAGNOSIS — N92.1 MENOMETRORRHAGIA: ICD-10-CM

## 2020-11-24 PROCEDURE — 99214 OFFICE O/P EST MOD 30 MIN: CPT | Performed by: OBSTETRICS & GYNECOLOGY

## 2020-11-24 ASSESSMENT — FIBROSIS 4 INDEX: FIB4 SCORE: 0.63

## 2020-11-24 NOTE — NON-PROVIDER
Pt here for Gyn/Follow-up  ER visit  Good Phone#: [phone]  Pharmacy verified.  Pt states vaginal bleeding has had stopped last night but still feeling weak. Also, Pt states having neck px started this morning.  Pt states no other complaints for today.  Mandarin  ID#[id number].

## 2020-11-25 NOTE — PROGRESS NOTES
Chief complaint;    Constantin Méndez is a 45 y.o.  who presents complaining of menometrorrhagia.  Patient is status post Depo-Provera IM on 2020 she has been having irregular bleeding.  Her bleeding finally subsided last night but she was seen in the emergency department on 2020 H&H 8.5/27.3 with normal platelets patient is feeling weak today but better than last week.  Ultrasound was performed on 2020 in the emergency department shows small 18 mm rim fibroid and 2 cm left ovarian cyst which is resolving.  Endometrial biopsy performed by Dr. Fierro negative for endometrial hyperplasia or endometrial carcinoma    Review of systems; denies fever chills abdominal pain, denies chest pain shortness of breath or urinary symptoms  Past medical history-  Past Medical History:   Diagnosis Date   • Eczema    • Hyperlipidemia      Past surgical history-  Past Surgical History:   Procedure Laterality Date   • BIOPSY GENERAL Right     mole excision right ankle   • CYST EXCISION Left     inferior to ear     Allergies-Patient has no known allergies.  Medications-  No current outpatient medications on file prior to visit.     No current facility-administered medications on file prior to visit.      Social history-  Social History     Socioeconomic History   • Marital status:      Spouse name: Karely   • Number of children: Not on file   • Years of education: Not on file   • Highest education level: Not on file   Occupational History   • Not on file   Social Needs   • Financial resource strain: Not on file   • Food insecurity     Worry: Not on file     Inability: Not on file   • Transportation needs     Medical: Not on file     Non-medical: Not on file   Tobacco Use   • Smoking status: Never Smoker   • Smokeless tobacco: Never Used   Substance and Sexual Activity   • Alcohol use: No   • Drug use: No   • Sexual activity: Not Currently     Partners: Male   Lifestyle   • Physical activity     Days  per week: Not on file     Minutes per session: Not on file   • Stress: Not on file   Relationships   • Social connections     Talks on phone: Not on file     Gets together: Not on file     Attends Gnosticism service: Not on file     Active member of club or organization: Not on file     Attends meetings of clubs or organizations: Not on file     Relationship status: Not on file   • Intimate partner violence     Fear of current or ex partner: Not on file     Emotionally abused: Not on file     Physically abused: Not on file     Forced sexual activity: Not on file   Other Topics Concern   • Not on file   Social History Narrative   • Not on file     Past Family History-no history of breast or ovarian cancer    Physical examination;  Alert and oriented x3  General a thin well-developed well-nourished female in no apparent distress  Vitals:    11/24/20 1552   BP: (!) 98/66   Weight: 71.1 kg (156 lb 12.8 oz)     Skin is warm and dry  Neck-supple  HEENT-head-atraumatic, normocephalic, EOMI, PERRLA  Cardiovascular-regular rate and rhythm, normal S1-S2, no murmurs or gallops  Lungs-clear to auscultation bilaterally  Back-negative CVA tenderness  Abdomen-nondistended positive bowel sounds soft nontender no masses or hepatosplenomegaly  Pelvic examination;  External genitalia-no visible lesions   Vagina-no blood  Cervix-no gross lesions  Uterus-normal size and shape, nontender  Adnexa without mass or tenderness  Extremities without cyanosis clubbing or edema  Neurologic exam grossly intact    Impression;  Menometrorrhagia-appears to be resolving after Depo-Provera administration  Anemia    Plan;  Patient has been instructed to start iron sulfate 3 times daily  Patient is also been instructed to increase oral hydration especially with Pedialyte  If Depo-Provera unsuccessful would recommend Mirena IUD  Chinese/English  line used    30  Minutes spent with the patient in face-to-face contact, greater than 50% of the time  spent on counseling and coordination of care. All questions answered in detail.

## 2021-01-04 ENCOUNTER — GYNECOLOGY VISIT (OUTPATIENT)
Dept: OBGYN | Facility: CLINIC | Age: 46
End: 2021-01-04
Payer: COMMERCIAL

## 2021-01-04 VITALS — DIASTOLIC BLOOD PRESSURE: 62 MMHG | SYSTOLIC BLOOD PRESSURE: 121 MMHG | BODY MASS INDEX: 27.64 KG/M2 | WEIGHT: 161 LBS

## 2021-01-04 DIAGNOSIS — Z30.430 ENCOUNTER FOR INSERTION OF MIRENA IUD: ICD-10-CM

## 2021-01-04 DIAGNOSIS — N93.9 ABNORMAL UTERINE BLEEDING (AUB): ICD-10-CM

## 2021-01-04 PROCEDURE — 58300 INSERT INTRAUTERINE DEVICE: CPT | Performed by: OBSTETRICS & GYNECOLOGY

## 2021-01-04 PROCEDURE — 99212 OFFICE O/P EST SF 10 MIN: CPT | Mod: 25 | Performed by: OBSTETRICS & GYNECOLOGY

## 2021-01-04 ASSESSMENT — FIBROSIS 4 INDEX: FIB4 SCORE: 0.63

## 2021-01-04 NOTE — PROCEDURES
IUD Insertion    Date/Time: 1/4/2021 2:53 PM  Performed by: Sofia Mcmullen D.O.  Authorized by: Sofia Mcmullen D.O.     Consent:     Consent obtained:  Verbal    Consent given by:  Patient    Procedure risks and benefits discussed: yes      Patient questions answered: yes      Patient agrees, verbalizes understanding, and wants to proceed: yes    Pre-procedure details:     Negative urine pregnancy test: no (pt on depo provera x 2mo)    Procedure:     Pelvic exam performed: yes      Sterile speculum placed in vagina: yes      Cervix visualized: yes      Cervix cleaned and prepped in sterile fashion: yes      Tenaculum applied to cervix: no      Dilation needed: no      Uterus sounded: yes      Uterus sound depth (cm):  10    IUD inserted with no complications: yes      IUD type:  Mirena    Strings trimmed: yes    Post-procedure:     Patient tolerated procedure well: yes      Patient will follow up after next period: yes

## 2021-01-04 NOTE — NON-PROVIDER
Patient here today for GYN follow up.  C/o still having some bleeding after starting depo-provera.  Patient states that her bleeding never stops and she doesn't know when her periods are because of that.  Phone # [phone]  Pharmacy verified.

## 2021-01-04 NOTE — PROGRESS NOTES
C/c: AUB follow up     History of present illness: 45 y.o. presents with follow up for vaginal  Bleeding. She started having this issue in August and H/H was down to 8 in November. EMB was negative for malignancy or hyperplasia and she was given a shot of Depo Provera to help but states still bleeding. It's not enough to be a full period but enough to need a pad daily.     Review of systems:  Pertinent positives documented in HPI and all other systems reviewed & are negative    Constitutional: negative  Respiratory: negative  Cardiovascular: negative  Gastrointestinal: negative  Genitourinary:negative  Integument/breast: negative  Hematologic/lymphatic: negative  Musculoskeletal:negative  Neurological: negative  Behavioral/Psych: negative  Endocrine: negative  Allergic/Immunologic: negative    PGYNHx: menarche age 14yo. Usually regular monthly lasting 3-4 days but changes this year as above.  Denies hx abnormal pap smears.  Last one 2019 NILM/HPV neg.  1 lifetime male sexual partner.    POBHx:   OB History    Para Term  AB Living   4 2 2   2 2   SAB TAB Ectopic Molar Multiple Live Births             2      # Outcome Date GA Lbr Kapil/2nd Weight Sex Delivery Anes PTL Lv   4 AB            3 AB            2 Term     M Vag-Spont   GAVINO   1 Term     F Vag-Spont   GAVINO         All PMH, PSH, allergies, social history and FH reviewed and updated today:  Past Medical History:   Diagnosis Date   • Eczema    • Hyperlipidemia        Past Surgical History:   Procedure Laterality Date   • BIOPSY GENERAL Right 2005    mole excision right ankle   • CYST EXCISION Left     inferior to ear       Allergies: No Known Allergies    Social History     Socioeconomic History   • Marital status:      Spouse name: Karely   • Number of children: Not on file   • Years of education: Not on file   • Highest education level: Not on file   Occupational History   • Not on file   Social Needs   • Financial resource strain:  Not on file   • Food insecurity     Worry: Not on file     Inability: Not on file   • Transportation needs     Medical: Not on file     Non-medical: Not on file   Tobacco Use   • Smoking status: Never Smoker   • Smokeless tobacco: Never Used   Substance and Sexual Activity   • Alcohol use: No   • Drug use: No   • Sexual activity: Not Currently     Partners: Male   Lifestyle   • Physical activity     Days per week: Not on file     Minutes per session: Not on file   • Stress: Not on file   Relationships   • Social connections     Talks on phone: Not on file     Gets together: Not on file     Attends Confucianist service: Not on file     Active member of club or organization: Not on file     Attends meetings of clubs or organizations: Not on file     Relationship status: Not on file   • Intimate partner violence     Fear of current or ex partner: Not on file     Emotionally abused: Not on file     Physically abused: Not on file     Forced sexual activity: Not on file   Other Topics Concern   • Not on file   Social History Narrative   • Not on file       Family History   Problem Relation Age of Onset   • Diabetes Mother    • Heart Disease Father    • Alcohol/Drug Father    • No Known Problems Brother    • No Known Problems Maternal Grandmother    • Cancer Maternal Grandfather    • No Known Problems Paternal Grandmother    • No Known Problems Paternal Grandfather    • No Known Problems Daughter    • No Known Problems Son        Physical exam:  /62 (BP Location: Right arm, Patient Position: Sitting)   Wt 73 kg (161 lb)     General:appears stated age, is in no apparent distress  Head: normocephalic, non-tender  Neck: neck is supple, no jugular venous distension  CV : regular rate and rhythm, no peripheral edema  Lungs: Normal respiratory effort. Clear to auscultation bilaterally  Abdomen: Bowel sounds positive, nondistended, soft, nontender x4, no rebound or guarding. No organomegaly. No masses.  Female GYN: normal  female external genitalia without lesions  normal cervix, uterus size 8weeks, normal consistency, normal adnexa without tenderness  Skin: No rashes, or ulcers or lesions seen  Psychiatric: Patient shows appropriate affect, is alert and oriented x3, intact judgment and insight.      Assessment  1. Encounter for insertion of mirena IUD  Consent for all Surgical, Special Diagnostic or Therapeutic Procedures   2. Abnormal uterine bleeding (AUB)         Plan  - 45 y.o.  here with AUB.   - discussed waiting for 6-9 months for Depo Provera amenorrhea effect vs starting Mirena IUD at this point. Pt previously did not know about IUD but states she has friends with the device who like it and would like to try this today.   See separate note.   Discussed menopausal age is 51 yo. Since Mirena expires in 5 years, at that time would remove and see how bleeding goes.   - Follow up 4 weeks for string check.

## 2021-02-12 ENCOUNTER — GYNECOLOGY VISIT (OUTPATIENT)
Dept: OBGYN | Facility: CLINIC | Age: 46
End: 2021-02-12
Payer: COMMERCIAL

## 2021-02-12 VITALS — WEIGHT: 154 LBS | DIASTOLIC BLOOD PRESSURE: 64 MMHG | SYSTOLIC BLOOD PRESSURE: 105 MMHG | BODY MASS INDEX: 26.43 KG/M2

## 2021-02-12 DIAGNOSIS — Z30.431 IUD CHECK UP: ICD-10-CM

## 2021-02-12 DIAGNOSIS — Z01.419 WELL WOMAN EXAM WITH ROUTINE GYNECOLOGICAL EXAM: ICD-10-CM

## 2021-02-12 PROCEDURE — 99396 PREV VISIT EST AGE 40-64: CPT | Performed by: OBSTETRICS & GYNECOLOGY

## 2021-02-12 ASSESSMENT — FIBROSIS 4 INDEX: FIB4 SCORE: 0.64

## 2021-02-12 NOTE — PROGRESS NOTES
C/c: IUD check    History of present illness: 46 y.o. presents with above chief complaint. Pt had Mirena IUD placed without complications.  Patient states she is having a small light spotting consistent with needing to use a thin liner pad.  Otherwise she has no other complaints.    Review of systems:  Pertinent positives documented in HPI and all other systems reviewed & are negative      All PMH, PSH, allergies, social history and FH reviewed and updated today:  Past Medical History:   Diagnosis Date   • Eczema    • Hyperlipidemia        Past Surgical History:   Procedure Laterality Date   • BIOPSY GENERAL Right 2005    mole excision right ankle   • CYST EXCISION Left 1997    inferior to ear       Allergies: No Known Allergies    Social History     Socioeconomic History   • Marital status:      Spouse name: Karely   • Number of children: Not on file   • Years of education: Not on file   • Highest education level: Not on file   Occupational History   • Not on file   Tobacco Use   • Smoking status: Never Smoker   • Smokeless tobacco: Never Used   Substance and Sexual Activity   • Alcohol use: No   • Drug use: No   • Sexual activity: Not Currently     Partners: Male   Other Topics Concern   • Not on file   Social History Narrative   • Not on file     Social Determinants of Health     Financial Resource Strain:    • Difficulty of Paying Living Expenses:    Food Insecurity:    • Worried About Running Out of Food in the Last Year:    • Ran Out of Food in the Last Year:    Transportation Needs:    • Lack of Transportation (Medical):    • Lack of Transportation (Non-Medical):    Physical Activity:    • Days of Exercise per Week:    • Minutes of Exercise per Session:    Stress:    • Feeling of Stress :    Social Connections:    • Frequency of Communication with Friends and Family:    • Frequency of Social Gatherings with Friends and Family:    • Attends Druze Services:    • Active Member of Clubs or  Organizations:    • Attends Club or Organization Meetings:    • Marital Status:    Intimate Partner Violence:    • Fear of Current or Ex-Partner:    • Emotionally Abused:    • Physically Abused:    • Sexually Abused:        Family History   Problem Relation Age of Onset   • Diabetes Mother    • Heart Disease Father    • Alcohol/Drug Father    • No Known Problems Brother    • No Known Problems Maternal Grandmother    • Cancer Maternal Grandfather    • No Known Problems Paternal Grandmother    • No Known Problems Paternal Grandfather    • No Known Problems Daughter    • No Known Problems Son        Physical exam:  /64 (BP Location: Right arm, Patient Position: Sitting)   Wt 69.9 kg (154 lb)     GENERAL APPEARANCE: healthy, alert, no distress, cooperative, smiling  NECK nontender, no masses, thyromegaly or nodules  ABDOMEN Abdomen soft, non-tender. BS normal. No masses,  No organomegaly  FEMALE GYN: normal female external genitalia without lesions, brown vaginal discharge noted, vulva pink without erythema or friability, urethra is normal without discharge or scarring, no bladder fullness or masses, normal vagina and normal vaginal tone, normal cervix, normal  uterus, size and consistency, IUD strings seen and palpated with normal length of strings, normal anus and perineum.  EXTREMITIES:negative clubbing, cyanosis, edema    NEURO Awake, alert and oriented x 3, Normal gait, no sensory deficits  SKIN No rashes, or ulcers or lesions seen  PSYCHIATRIC: Patient shows appropriate affect, is alert and oriented x3, intact judgment and insight.      1. Well woman exam with routine gynecological exam  MA DIAGNOSTIC MAMMO BILAT W/CAD   2. IUD check up       IUD in appropriate location    Spent  10 minutes in face-to-face patient contact in which greater than 50% of that visit was spent in counseling/coordination of care of IUD and normal menstrual irregularity side effects. Reminded patient to check for strings monthly  and to call the office if IUD has fallen out or any other problems should arise. Also reminded patient IUD expires in 5 years.    Discussed that most women have relief from spotting in approximately 6 months.  Patient to follow-up in 6 months for reevaluation of spotting.  If at that time it is not resolved, would consider more definitive surgical management.    Last mammogram was in July 2020.  Orders placed for routine mammogram this year.

## 2021-02-12 NOTE — NON-PROVIDER
Patient here today for String check  Has Mirena   Placed on 01/04/2021  Patient states no complaints today.  Phone # [phone]  Pharmacy verified.

## 2021-09-07 ENCOUNTER — TELEPHONE (OUTPATIENT)
Dept: MEDICAL GROUP | Facility: MEDICAL CENTER | Age: 46
End: 2021-09-07

## 2021-09-10 ENCOUNTER — APPOINTMENT (OUTPATIENT)
Dept: MEDICAL GROUP | Facility: MEDICAL CENTER | Age: 46
End: 2021-09-10
Payer: COMMERCIAL

## 2021-09-27 ENCOUNTER — OFFICE VISIT (OUTPATIENT)
Dept: MEDICAL GROUP | Facility: MEDICAL CENTER | Age: 46
End: 2021-09-27
Payer: COMMERCIAL

## 2021-09-27 VITALS
HEIGHT: 63 IN | WEIGHT: 167.55 LBS | OXYGEN SATURATION: 97 % | RESPIRATION RATE: 16 BRPM | BODY MASS INDEX: 29.69 KG/M2 | DIASTOLIC BLOOD PRESSURE: 68 MMHG | HEART RATE: 76 BPM | TEMPERATURE: 98.1 F | SYSTOLIC BLOOD PRESSURE: 114 MMHG

## 2021-09-27 DIAGNOSIS — Z23 NEED FOR VACCINATION: ICD-10-CM

## 2021-09-27 DIAGNOSIS — R22.32 MASS OF LEFT AXILLA: ICD-10-CM

## 2021-09-27 DIAGNOSIS — E55.9 VITAMIN D DEFICIENCY: ICD-10-CM

## 2021-09-27 DIAGNOSIS — M79.622 ARMPIT PAIN, LEFT: ICD-10-CM

## 2021-09-27 DIAGNOSIS — R22.1 NECK MASS: ICD-10-CM

## 2021-09-27 PROCEDURE — 90471 IMMUNIZATION ADMIN: CPT | Performed by: INTERNAL MEDICINE

## 2021-09-27 PROCEDURE — 90686 IIV4 VACC NO PRSV 0.5 ML IM: CPT | Performed by: INTERNAL MEDICINE

## 2021-09-27 PROCEDURE — 99213 OFFICE O/P EST LOW 20 MIN: CPT | Mod: 25 | Performed by: INTERNAL MEDICINE

## 2021-09-27 RX ORDER — ERGOCALCIFEROL 1.25 MG/1
50000 CAPSULE ORAL
Qty: 12 CAPSULE | Refills: 0 | Status: SHIPPED | OUTPATIENT
Start: 2021-09-27 | End: 2023-07-02

## 2021-09-27 ASSESSMENT — PATIENT HEALTH QUESTIONNAIRE - PHQ9: CLINICAL INTERPRETATION OF PHQ2 SCORE: 0

## 2021-09-27 ASSESSMENT — FIBROSIS 4 INDEX: FIB4 SCORE: 0.64

## 2021-09-27 NOTE — PROGRESS NOTES
"New Patient    Constantin Méndez is a 46 y.o. female who presents today with the following:    CC:   Chief Complaint   Patient presents with   • Establish Care       HPI:     Armpit pain, left  Intermittent left armpit pain for one year      Mass of left axilla  left armpit pain and lump intermittently for one year       Neck mass  Posterior neck lump for many years      Vitamin D deficiency  Recent vit D level 16  Recommend prescription Vitamin D3 50,000 international units weekly for 12 weeks (sent to pharmacy), then over the counter vitamin D3 3711-9126 international units daily      she will get her mammogram ordered by her OBGYN    Current Outpatient Medications   Medication Sig Dispense Refill   • vitamin D2, Ergocalciferol, (DRISDOL) 1.25 MG (79321 UT) Cap capsule Take 1 Capsule by mouth every 7 days. 12 Capsule 0     No current facility-administered medications for this visit.       Allergies, past medical history, past surgical history, medications, family history, social history reviewed and updated.    ROS   Constitutional: Denies fevers or chills  Eyes: Denies changes in vision  Ears/Nose/Throat/Mouth: Denies nasal congestion or sore throat   Cardiovascular: Denies chest pain or palpitations   Respiratory: Denies shortness of breath , Denies cough  Gastrointestinal/Hepatic: Denies abd pain, nausea, vomiting   Genitourinary: Denies dysuria or frequency  Musculoskeletal/Rheum: Denies joint pain and swelling   Neurological: Denies headache  Psychiatric: Denies mood disorder   Endocrine: Denies hx of diabetes or thyroid dysfunction  Heme/Oncology/Lymph Nodes: Denies weight changes or enlarged LNs.    Physical Exam  Vitals: /68 (BP Location: Left arm, Patient Position: Sitting, BP Cuff Size: Adult)   Pulse 76   Temp 36.7 °C (98.1 °F) (Temporal)   Resp 16   Ht 1.61 m (5' 3.39\")   Wt 76 kg (167 lb 8.8 oz)   SpO2 97%   BMI 29.32 kg/m²   General: Alert, pleasant, NAD  HEENT: Normocephalic.  EOMI, no icterus " or pallor.  Conjunctivae and lids normal. External ears normal. Wearing a mask. Oropharynx non-erythematous, mucous membranes moist.  Neck supple.  No thyromegaly or masses palpated. Posterior neck lump  Lymph: No cervical or supraclavicular lymphadenopathy.   Cardiovascular: Regular rate and rhythm.  S1 and S2 normal.  No murmurs appreciated.  Respiratory: Normal respiratory effort.  Clear to auscultation bilaterally.  Abdomen: Non-distended, soft, non-tender  Skin: Warm, dry, no rashes.  Musculoskeletal: Gait is normal.  Moves all extremities well.  Extremities: left axillary lump or swelling with slight tenderness No leg edema.  Radial pulses 2+ symmetric.   Psych:  Affect/mood is normal, judgement is good, memory is intact, grooming is appropriate.        Assessment and Plan    1. Armpit pain, left  - US-EXTREMITY NON VASCULAR UNILATERAL LEFT; Future    2. Mass of left axilla  - US-EXTREMITY NON VASCULAR UNILATERAL LEFT; Future    3. Neck mass  - US-SOFT TISSUES OF HEAD - NECK; Future    4. Need for vaccination  - INFLUENZA VACCINE QUAD INJ (PF)    5. Vitamin D deficiency  - vitamin D2, Ergocalciferol, (DRISDOL) 1.25 MG (40396 UT) Cap capsule; Take 1 Capsule by mouth every 7 days.  Dispense: 12 Capsule; Refill: 0  Recent vit D level 16  Recommend prescription Vitamin D3 50,000 international units weekly for 12 weeks (sent to pharmacy), then over the counter vitamin D3 7483-5659 international units daily    Recommend patient to do the mammogram as well ordered by her GYN  She [facility] recently, will need to request results.    Follow-up:Return in about 2 months (around 11/27/2021), or if symptoms worsen or fail to improve.    This note was created using voice recognition software. There may be unintended errors in spelling, grammar or content.

## 2021-09-28 NOTE — ASSESSMENT & PLAN NOTE
Recent vit D level 16  Recommend prescription Vitamin D3 50,000 international units weekly for 12 weeks (sent to pharmacy), then over the counter vitamin D3 1015-1331 international units daily

## 2021-12-20 ENCOUNTER — TELEPHONE (OUTPATIENT)
Dept: MEDICAL GROUP | Facility: MEDICAL CENTER | Age: 46
End: 2021-12-20

## 2021-12-20 NOTE — TELEPHONE ENCOUNTER
Phone Number Called: [phone] (home) [phone] (work)      Call outcome: Spoke to patient regarding message below.    Message: PT forgot appt. rescheduled for 1/13 at 9am TS

## 2022-01-13 ENCOUNTER — APPOINTMENT (OUTPATIENT)
Dept: MEDICAL GROUP | Facility: MEDICAL CENTER | Age: 47
End: 2022-01-13

## 2023-07-02 ENCOUNTER — OFFICE VISIT (OUTPATIENT)
Dept: URGENT CARE | Facility: PHYSICIAN GROUP | Age: 48
End: 2023-07-02

## 2023-07-02 VITALS
HEART RATE: 78 BPM | HEIGHT: 62 IN | BODY MASS INDEX: 30.36 KG/M2 | RESPIRATION RATE: 16 BRPM | WEIGHT: 165 LBS | OXYGEN SATURATION: 98 % | DIASTOLIC BLOOD PRESSURE: 74 MMHG | SYSTOLIC BLOOD PRESSURE: 110 MMHG | TEMPERATURE: 97.9 F

## 2023-07-02 DIAGNOSIS — B02.9 HERPES ZOSTER WITHOUT COMPLICATION: ICD-10-CM

## 2023-07-02 PROCEDURE — 99213 OFFICE O/P EST LOW 20 MIN: CPT | Performed by: NURSE PRACTITIONER

## 2023-07-02 PROCEDURE — 3078F DIAST BP <80 MM HG: CPT | Performed by: NURSE PRACTITIONER

## 2023-07-02 PROCEDURE — 3074F SYST BP LT 130 MM HG: CPT | Performed by: NURSE PRACTITIONER

## 2023-07-02 RX ORDER — VALACYCLOVIR HYDROCHLORIDE 1 G/1
1000 TABLET, FILM COATED ORAL 3 TIMES DAILY
Qty: 21 TABLET | Refills: 0 | Status: SHIPPED | OUTPATIENT
Start: 2023-07-02 | End: 2023-07-09

## 2023-07-02 NOTE — PROGRESS NOTES
"Gosia Méndez is a 48 y.o. female who presents with Arm Pain (Under armpit and travel lover to left  front side off torso x2 weeks/Rush, red clustered pimples x4 days)            Arm Pain   Incident onset: reports new onset of pain to left armpit that started 2 weeks ago. 4 days ago she developed a rash to her left arm, with red lesions. She has tried nothing for the symptoms.       Review of Systems   Skin:  Positive for rash.   All other systems reviewed and are negative.         Past Medical History:   Diagnosis Date    Eczema     Hyperlipidemia       Past Surgical History:   Procedure Laterality Date    BIOPSY GENERAL Right 2005    mole excision right ankle    CYST EXCISION Left 1997    inferior to ear    APPENDECTOMY        Social History     Socioeconomic History    Marital status:      Spouse name: Karely    Number of children: Not on file    Years of education: Not on file    Highest education level: Not on file   Occupational History    Not on file   Tobacco Use    Smoking status: Never    Smokeless tobacco: Never   Vaping Use    Vaping Use: Never used   Substance and Sexual Activity    Alcohol use: No    Drug use: No    Sexual activity: Not Currently     Partners: Male   Other Topics Concern    Not on file   Social History Narrative    Not on file     Social Determinants of Health     Financial Resource Strain: Not on file   Food Insecurity: Not on file   Transportation Needs: Not on file   Physical Activity: Not on file   Stress: Not on file   Social Connections: Not on file   Intimate Partner Violence: Not on file   Housing Stability: Not on file         Objective     /74 (Patient Position: Sitting)   Pulse 78   Temp 36.6 °C (97.9 °F) (Temporal)   Resp 16   Ht 1.575 m (5' 2\")   Wt 74.8 kg (165 lb)   SpO2 98%   BMI 30.18 kg/m²      Physical Exam  Vitals and nursing note reviewed.   Constitutional:       Appearance: Normal appearance. She is normal weight.   HENT:      " Head: Normocephalic and atraumatic.      Nose: Nose normal.      Mouth/Throat:      Mouth: Mucous membranes are moist.      Pharynx: Oropharynx is clear.   Eyes:      Extraocular Movements: Extraocular movements intact.      Pupils: Pupils are equal, round, and reactive to light.   Cardiovascular:      Rate and Rhythm: Normal rate and regular rhythm.   Pulmonary:      Effort: Pulmonary effort is normal.   Musculoskeletal:         General: Normal range of motion.        Arms:       Cervical back: Normal range of motion.   Skin:     General: Skin is warm and dry.      Capillary Refill: Capillary refill takes less than 2 seconds.   Neurological:      General: No focal deficit present.      Mental Status: She is alert and oriented to person, place, and time. Mental status is at baseline.   Psychiatric:         Mood and Affect: Mood normal.         Speech: Speech normal.         Thought Content: Thought content normal.         Judgment: Judgment normal.                             Assessment & Plan        1. Herpes zoster without complication  - valacyclovir (VALTREX) 1 GM Tab; Take 1 Tablet by mouth 3 times a day for 7 days.  Dispense: 21 Tablet; Refill: 0       Take full course of antivirals as directed  Tylenol and ibuprofen as needed for pain  Advised her to wear long sleeve shirt to protect against exposure to other individuals  Contagion discussed  Supportive care, differential diagnoses, and indications for immediate follow-up discussed with patient.    Pathogenesis of diagnosis discussed including typical length and natural progression.    Instructed to return to  or nearest emergency department if symptoms fail to improve, for any change in condition, further concerns, or new concerning symptoms.  Patient states understanding of the plan of care and discharge instructions.

## 2024-05-07 ENCOUNTER — OFFICE VISIT (OUTPATIENT)
Dept: URGENT CARE | Facility: CLINIC | Age: 49
End: 2024-05-07

## 2024-05-07 VITALS
WEIGHT: 165 LBS | SYSTOLIC BLOOD PRESSURE: 120 MMHG | HEART RATE: 85 BPM | BODY MASS INDEX: 29.23 KG/M2 | RESPIRATION RATE: 16 BRPM | OXYGEN SATURATION: 95 % | TEMPERATURE: 97.5 F | HEIGHT: 63 IN | DIASTOLIC BLOOD PRESSURE: 82 MMHG

## 2024-05-07 DIAGNOSIS — M75.02 ADHESIVE CAPSULITIS OF LEFT SHOULDER: ICD-10-CM

## 2024-05-07 PROCEDURE — 3079F DIAST BP 80-89 MM HG: CPT | Performed by: STUDENT IN AN ORGANIZED HEALTH CARE EDUCATION/TRAINING PROGRAM

## 2024-05-07 PROCEDURE — 99213 OFFICE O/P EST LOW 20 MIN: CPT | Performed by: STUDENT IN AN ORGANIZED HEALTH CARE EDUCATION/TRAINING PROGRAM

## 2024-05-07 PROCEDURE — 3074F SYST BP LT 130 MM HG: CPT | Performed by: STUDENT IN AN ORGANIZED HEALTH CARE EDUCATION/TRAINING PROGRAM

## 2024-05-07 RX ORDER — NAPROXEN SODIUM 550 MG/1
550 TABLET ORAL 2 TIMES DAILY WITH MEALS
Qty: 60 TABLET | Refills: 0 | Status: SHIPPED | OUTPATIENT
Start: 2024-05-07

## 2024-05-08 NOTE — PROGRESS NOTES
Subjective:   CHIEF COMPLAINT  Chief Complaint   Patient presents with    Shoulder Pain     X2 months, Cannot move left shoulder, noemi,        DESTINEY  Constantin Méndez is a 49 y.o. female who presents with a chief complaint of left shoulder pain of insidious onset x 2 months.  No trauma.  Recently was in [place] and received a steroid injection in the shoulder which has not helped.  She has also tried cupping with no relief of symptoms.  She has not taken any medications for symptomatic relief.  Says pain is aggravated with general range of motion.  Previous history of adhesive capsulitis of her right shoulder and says this is similar to her current symptoms in her left shoulder.  No history of left shoulder surgery.    REVIEW OF SYSTEMS  General: no fever or chills  GI: no nausea or vomiting  See HPI for further details.    PAST MEDICAL HISTORY   has a past medical history of Eczema and Hyperlipidemia.    SURGICAL HISTORY   has a past surgical history that includes cyst excision (Left, 1997); biopsy general (Right, 2005); and appendectomy.    ALLERGIES  No Known Allergies    CURRENT MEDICATIONS  Home Medications       Reviewed by Micheal Mcclellan D.O. (Physician) on 05/07/24 at 1751  Med List Status: <None>     Medication Last Dose Status   naproxen sodium (ANAPROX) 550 MG tablet  Active                    SOCIAL HISTORY  Social History     Tobacco Use    Smoking status: Never    Smokeless tobacco: Never   Vaping Use    Vaping status: Never Used   Substance and Sexual Activity    Alcohol use: No    Drug use: No    Sexual activity: Not Currently     Partners: Male       FAMILY HISTORY  Family History   Problem Relation Age of Onset    Diabetes Mother     Heart Disease Father     Alcohol/Drug Father     No Known Problems Brother     No Known Problems Maternal Grandmother     Cancer Maternal Grandfather     No Known Problems Paternal Grandmother     No Known Problems Paternal Grandfather     No Known Problems Daughter     No  "Known Problems Son           Objective:   PHYSICAL EXAM  VITAL SIGNS: /82 (BP Location: Left arm, Patient Position: Sitting, BP Cuff Size: Adult)   Pulse 85   Temp 36.4 °C (97.5 °F) (Temporal)   Resp 16   Ht 1.6 m (5' 3\")   Wt 74.8 kg (165 lb)   SpO2 95%   BMI 29.23 kg/m²     Gen: no acute distress, normal voice  Skin: dry, intact, moist mucosal membranes  Eyes: No conjunctival injection b/l  Neck: Normal range of motion. No meningeal signs.   Lungs: No increased work of breathing.  CTAB w/ symmetric expansion  CV: RRR w/o murmurs or clicks  MSK: Left shoulder without any erythema, ecchymosis or edema.  AROM with forward flexion and abduction limited to approximately 90 degrees, passively I could range the shoulder approximately 10 more degrees.  Internal rotation/adduction to lumbar spine.  TTP along the anterior rotator cuff.  Very subtle weakness with external rotation and forward flexion secondary to pain.  No sensory deficits.    Assessment/Plan:     1. Adhesive capsulitis of left shoulder  naproxen sodium (ANAPROX) 550 MG tablet      History exam consistent with adhesive capsulitis.  Received a steroid injection when in [place] this last month.  -Ordered naproxen  -Encouraged range of motion and stretching  -Return to urgent care any new/worsening symptoms or further questions or concerns.  Patient understood everything discussed.  All questions were answered.      Differential diagnosis and supportive care discussed. Follow-up as needed if symptoms worsen or fail to improve to PCP, Urgent care or Emergency Room.    Please note that this dictation was created using voice recognition software. I have made a reasonable attempt to correct obvious errors, but I expect that there are errors of grammar and possibly content that I did not discover before finalizing the note.         "

## 2024-05-16 ENCOUNTER — APPOINTMENT (OUTPATIENT)
Dept: URGENT CARE | Facility: CLINIC | Age: 49
End: 2024-05-16

## 2024-10-23 ENCOUNTER — APPOINTMENT (OUTPATIENT)
Dept: URGENT CARE | Facility: CLINIC | Age: 49
End: 2024-10-23